# Patient Record
Sex: MALE | Race: WHITE | Employment: FULL TIME | URBAN - METROPOLITAN AREA
[De-identification: names, ages, dates, MRNs, and addresses within clinical notes are randomized per-mention and may not be internally consistent; named-entity substitution may affect disease eponyms.]

---

## 2020-04-23 ENCOUNTER — VIRTUAL VISIT (OUTPATIENT)
Dept: FAMILY MEDICINE | Facility: OTHER | Age: 30
End: 2020-04-23

## 2020-04-23 NOTE — PROGRESS NOTES
"Date: 2020 12:01:59  Clinician: Miasha Ramos  Clinician NPI: [id number]  Patient: Jared Cabrera  Patient : 1990  Patient Address: [address] [place] [address] [place] [address]  Patient Phone: [phone]  Visit Protocol: Ingrown toenail  Patient Summary:  Jared is a 30 year old ( : 1990 ) male who initiated a Visit for evaluation of Ingrown Toenail. When asked the question \"Please sign me up to receive news, health information and promotions from [employer].\", Jared responded \"No\".   A synchronous visit is necessary because the patient reported the following abnormal symptoms:   Unable to walk without limping (requires clarification)   Jared uploaded images of his condition.   His big toe on his left foot is affected. His symptoms started 1 to 7 days ago.   Symptom Details   Jared has redness, pain, swelling, and warmth on the affected toe. He also notes growth of new skin tissue and discharge on or near the affected toe. Jared feels feverish but was unable to measure his temperature at this time. He is unable to put his shoes on. He has had similar symptoms on the same toe before.   The patient also reported the following:     Redness: There are not red lines or streaks going up his leg from the affected toe.     Pain: Jared describes the pain as moderate (4-6 on a 10 point pain scale). He does not experience pain when pressure is applied to the affected toe.    Discharge: The affected toe has white discharge.     He also denies a deformed or crooked nail and open sores on or near the affected toe. He also claims that the symptoms did not start as a result of an injury. He does not have a circulation problem and has not had nerve damage in his foot.   Jared tried soaking the affected foot to treat his condition. The foot soak was somewhat effective.   Weight: 175 lbs   Jared does not smoke or use smokeless tobacco.   Additional information as reported by the patient (free text): This isn't the " first time I've had an ingrown toenail, but it's never been so swollen, painful, and lasted this long.  Typically, I can easily wait it out and manage through the pain.   Weight: 175 lbs    MEDICATIONS: No current medications, ALLERGIES: NKDA  Clinician Response:  Dear Jared,     Your symptoms today are consistent with a paronychia (or cellulitis) of your left great toe.&nbsp; This is likely secondary to the recent skin injury you had.&nbsp;&nbsp;  Please soak the affected toe (and or foot) in a warm epsom salt bath for 20-30 minutes 4 times a day, and pull the softened skin back from the nail after the soak to facilitate further drainage from the site.&nbsp; Apply bacitracin and a dressing for the next few days to keep the skin soft and allow the area to continue to drain.&nbsp;  Should symptoms persist or worsen you may need to be evaluated further in the urgent care.&nbsp; &nbsp;    Diagnosis: Cellulitis of left toe  Diagnosis ICD: L03.032  Triage Notes: I reviewed the patient's history, verified their identity, and explained the Visit process.    Confirmed demographics via phonecall,    He pulled a hangnail from the outside aspect of the left great toenail.    As an avid runner, he has had ingrown toenails in the past which have typically resolved with soaking.      This has been going on for about 3 days and pain and swelling have worsened.  Painful to walk, but he is able to walk.  Synchronous Triage: phone, status: completed, duration: 379 seconds  Prescription: amoxicillin-pot clavulanate (Augmentin) 875-125 mg oral tablet 20 tablet, 10 days supply. Take 1 tablet by mouth every 12 hours for 10 days. Refills: 0, Refill as needed: no, Allow substitutions: yes  Pharmacy: [id number] - [phone] - [address] [place] [address]

## 2020-05-24 ENCOUNTER — VIRTUAL VISIT (OUTPATIENT)
Dept: FAMILY MEDICINE | Facility: OTHER | Age: 30
End: 2020-05-24

## 2020-05-24 NOTE — PROGRESS NOTES
"Date: 2020 14:03:10  Clinician: Jose C Sevilla  Clinician NPI: [id number]  Patient: Jared Cabrera  Patient : 1990  Patient Address: [address] [place] [id number]  Patient Phone: [phone]  Visit Protocol: URI  Patient Summary:  Jared is a 30 year old ( : 1990 ) male who initiated a Visit for COVID-19 (Coronavirus) evaluation and screening. When asked the question \"Please sign me up to receive news, health information and promotions from [employer].\", Jared responded \"No\".    Jared states his symptoms started 1-2 days ago.   His symptoms consist of nausea, chills, vomiting, malaise, and myalgia. Jared also feels feverish.   Symptom details   Temperature: His current temperature is 102.4 degrees Fahrenheit. Jared has had a temperature over 100 degrees Fahrenheit for 1-2 days.    Jared denies having teeth pain, ageusia, diarrhea, facial pain or pressure, sore throat, wheezing, cough, nasal congestion, rhinitis, ear pain, headache, and anosmia. He also denies having recent facial or sinus surgery in the past 60 days. He is not experiencing dyspnea.   Precipitating events  He has not recently been exposed to someone with influenza. Jared has been in close contact with the following high risk individuals: children under the age of 5.   Pertinent COVID-19 (Coronavirus) information  In the past 14 days, Jared has not worked in a congregate living setting.   He does not work or volunteer as healthcare worker or a  and does not work or volunteer in a healthcare facility.   aJred also has not lived in a congregate living setting in the past 14 days. He lives with a healthcare worker.   Jared has not had a close contact with a laboratory-confirmed COVID-19 patient within 14 days of symptom onset.   Triage Point(s) temporarily suspended for COVID-19 (Coronavirus) screening  Jared reported the following symptoms which were previously protocol referral points. These protocol referral points have " temporarily been removed for purposes of COVID-19 (Coronavirus) screening.   Temperature &gt; 102. Current temperature: 102.4    Pertinent medical history  Jared has taken an antibiotic medication in the past month. Antibiotic details as reported by the patient (free text): Augmentin - sinus infection about a month ago   Jared needs a return to work/school note.   Weight: 168 lbs   Jared does not smoke or use smokeless tobacco.   Weight: 168 lbs  A synchronous phone visit was initiated by the provider for the following reason: discuss symptoms    MEDICATIONS: No current medications, ALLERGIES: NKDA  Clinician Response:  Dear Jared,      Dear Jared  Your symptoms show that you may have coronavirus (COVID-19). This illness can cause fever, cough and trouble breathing. Many people get a mild case and get better on their own. Some people can get very sick.  What should I do?  We would like to test you for this virus. This will be a curbside test done outside the clinic.  Please call [phone] to schedule your visit. Explain that you were [facility] by [facility] to have a COVID-19 test. Be ready to share your [facility] visit ID number.  Starting now:  Stay at least 6 feet away from others. (If someone will drive you to your test, stay in the backseat, as far away from the  as you can.)   Don't go to work, school or anywhere else. When it's time for your test, go straight to the testing site. Don't make any stops on the way there or back.   Wash your hands and face often. Use soap and water.   Cover your mouth and nose with a mask, tissue or washcloth.   Don't touch anyone. No hugging, kissing or handshakes.  While at home   Stay home and away from others (self-isolate) until:  You've had no fever---and no medicine that reduces fever---for 3 full days (72 hours). And...  Your other symptoms have gotten better. For example, your cough or breathing has improved. And...  At least 10 days have passed since your symptoms started.   "During this time:  Stay in your own room (and use your own bathroom), if you can.  Don't go to work, school or anywhere else.  Stay away from others in your home. No hugging, kissing or shaking hands.  Don't let anyone visit.  Cover your mouth and nose with a mask, tissue or washcloth to avoid spreading germs.  Clean \"high touch\" surfaces often (doorknobs, counters, handles, etc.). Use a household cleaning spray or wipes.  Wash your hands and face often. Use soap and water.  How can I take care of myself?  1. Get lots of rest. Drink extra fluids (unless your doctor has told you not to).  2. Take Tylenol (acetaminophen) for fever or pain. If you have liver or kidney problems, ask your family doctor if it's okay to take Tylenol.  Adults can take either:   650 mg (two 325 mg pills) every 4 to 6 hours, or...  1,000 mg (two 500 mg pills) every 8 hours as needed.   Note: Don't take more than 3,000 mg in one day.   Acetaminophen is found in many medicines (both prescribed and over-the-counter medicines). Read all labels to be sure you don't take too much.   For children, check the Tylenol bottle for the right dose. The dose is based on the child's age or weight.  3. If you have other health problems (like cancer, heart failure, an organ transplant or severe kidney disease): Call your specialty clinic if you don't feel better in the next 2 days.  4. Know when to call 911: If your breathing is so bad that it keeps you from doing normal activities, call 911 or go to the emergency room. Tell them that you've been staying home and may have COVID-19.  5. Sign up for [employer]. We know it's scary to hear that you might have COVID-19. We want to track your symptoms to make sure you're okay over the next 2 weeks. Please look for an email from [employer]---this is a free, online program that we'll use to keep in touch. To sign up, follow the link in the email. Learn more at [email or web address].  6. The following " will serve as your written order for this Covid Test ordered by me for the indication of suspected Covid [Z20.828]: The test will be ordered in [facility], our electronic health record after you are scheduled and will show as ordered and authorized by Adonay Trujillo MD   Order: Covid-19 (Coronavirus) PCR for SYMPTOMATIC testing from [facility]  Where can I get more information?  To learn more about COVID-19 and how to care for yourself at home, please visit the CDC website at https://www.cdc.gov/coronavirus/2019-ncov/about/steps-when-sick.html.  For more about your care at [facility], please visit [email or web address]  If you'd like to be part of a COVID-19 clinical trial (research study) at the [facility], go to [email or web address]://clinicalaffairs.[facility]-clinical-trials for details.    COVID-19 (Coronavirus) General Information  Because there is currently no vaccine to prevent infection, the best way to protect yourself is to avoid being exposed to this virus. Common symptoms of COVID-19 include but are not limited to fever, cough, and shortness of breath. These symptoms appear 2-14 days after you are exposed to the virus that causes COVID-19. Click here for more information from the CDC on how to protect yourself.  If you are sick with COVID-19 or suspect you are infected with the virus that causes COVID-19, follow the steps here from the CDC to help prevent the disease from spreading to people in your home and community.  Click here for general information from the CDC on testing.  If you develop any of these emergency warning signs for COVID-19, get medical attention immediately:     Trouble breathing    Persistent pain or pressure in the chest    New confusion or inability to arouse    Bluish lips or face      Call your doctor or clinic before going in. Call 911 if you have a medical emergency and notify the  you have or think you may have COVID-19.  For more detailed and up to date  information on COVID-19 (Coronavirus), please visit the CDC website.   Diagnosis: Myalgia  Diagnosis ICD: M79.1  Triage Notes: I reviewed the patient's history, verified their identity, and explained the Visit process.    I called the patient at around 3 pm on May 24, 2020.  Patient has been experiencing severe fatigue, high fevers, chills, myalgia, nausea, vomiting.  He lives with a health care worker who had vomiting about a day before patient started feeling ill.  I recommended that the patient undergo COVID-19 testing today.      go to the ER if any worsening shortness of breath/chest pain/lightheadedness appears.      Jose C Sevilla MD  Synchronous Triage: phone, status: completed, duration: 526 seconds

## 2020-05-27 DIAGNOSIS — Z20.822 SUSPECTED COVID-19 VIRUS INFECTION: Primary | ICD-10-CM

## 2020-05-27 PROCEDURE — 87635 SARS-COV-2 COVID-19 AMP PRB: CPT | Mod: 90 | Performed by: FAMILY MEDICINE

## 2020-05-27 PROCEDURE — 99000 SPECIMEN HANDLING OFFICE-LAB: CPT | Performed by: FAMILY MEDICINE

## 2020-05-29 LAB
SARS-COV-2 RNA SPEC QL NAA+PROBE: NOT DETECTED
SPECIMEN SOURCE: NORMAL

## 2020-11-28 ENCOUNTER — E-VISIT (OUTPATIENT)
Dept: URGENT CARE | Facility: CLINIC | Age: 30
End: 2020-11-28
Payer: COMMERCIAL

## 2020-11-28 DIAGNOSIS — Z20.822 CLOSE EXPOSURE TO 2019 NOVEL CORONAVIRUS: Primary | ICD-10-CM

## 2020-11-28 PROCEDURE — 99421 OL DIG E/M SVC 5-10 MIN: CPT | Performed by: NURSE PRACTITIONER

## 2020-11-28 NOTE — LETTER
Dear Jared Cabrera,    Based on your exposure to COVID-19 (coronavirus), we would like to test you for this virus. I have placed an order for this test.    For all employees or close contacts (except [place] and [employer] - see below), go to your [employer] home page and scroll down to the section that says  You have an appointment that needs to be scheduled  and click the large green button that says  Schedule Now  and follow the steps to find the next available opening.     If you are unable to complete these steps or if you cannot find any available times, please call [phone] to schedule employee testing.       [place] employees or close contacts, please call [phone].   [place] [employer] employees or close contacts call [phone].      If you know you have had close contact with someone who tested positive, you should be quarantined for 14 days after this exposure. You should stay in quarantine for the14 days even if the covid test is negative.     Quarantine means:  Stay home and away from others. Don't go to school or anywhere else. Generally quarantine means staying home from work but there are some exceptions to this. Please contact your workplace.  No hugging, kissing or shaking hands.  Don't let anyone visit.  Cover your mouth and nose with a mask, tissue or washcloth to avoid spreading germs.  Wash your hands and face often. Use soap and water.    What are the symptoms of COVID-19?  The most common symptoms are cough, fever and trouble breathing. Less common symptoms include headache, body aches, fatigue (feeling very tired), chills, sore throat, stuffy or runny nose, diarrhea (loose poop), loss of taste or smell, belly pain, and nausea or vomiting (feeling sick to your stomach or throwing up).  After 14 days, if you have still don't have symptoms, you likely don't have this virus.  If you develop symptoms, follow these guidelines.  If you're normally healthy: Please start another eVisit.  If  you have a serious health problem (like cancer, heart failure, an organ transplant or kidney disease): Call your specialty clinic. Let them know that you might have COVID-19.    When it's time for your COVID test:  Stay at least 6 feet away from others. (If someone will drive you to your test, stay in the backseat, as far away from the  as you can.)  Cover your mouth and nose with a mask, tissue or washcloth.  Go straight to the testing site. Don't make any stops on the way there or back.    Please note  Patients in these groups are at risk for severe illness due to COVID-19:    People 65 years and older    People who live in a nursing home or long-term care facility    People with chronic disease (lung, heart, cancer, diabetes, kidney, liver, immunologic)    People who have a weakened immune system, including those who:  o Are in cancer treatment  o Take medicine that weakens the immune system, such as corticosteroids  o Had a bone marrow or organ transplant  o Have an immune deficiency  o Have poorly controlled HIV or AIDS  o Are obese (body mass index of 40 or higher)  o Smoke regularly    Where can I get more information?  [facility] - About COVID-19: [email or web address]  CDC - What to Do If You're Sick: www.cdc.gov/coronavirus/2019-ncov/about/steps-when-sick.html  CDC - Ending Home Isolation: www.cdc.gov/coronavirus/2019-ncov/hcp/disposition-in-home-patients.html  CDC - Caring for Someone: www.cdc.gov/coronavirus/2019-ncov/if-you-are-sick/care-for-someone.html  [facility] - Interim Guidance for Hospital Discharge to Home: www.health.[facility].[place]/diseases/coronavirus/hcp/hospdischarge.pdf  [facility] clinical trials (COVID-19 research studies): clinicalaffairs.[facility]/n-clinical-trials  Below are the COVID-19 hotlines at the [place] Department of [facility] ([facility]). Interpreters are available.  For health questions: Call 675-545-3932 or 1-849.990.9334 (7 a.m. to 7 p.m.)  For  questions about schools and childcare: Call 218-676-8868 or 1-166.204.6144 (7 a.m. to 7 p.m.)

## 2020-11-28 NOTE — LETTER
[facility]  [address] [address]  Phone: [phone]    November 28, 2020        Jared Cabrera  [address]  [place] [address]          To whom it may concern:    RE: Jared Cabrera    Was tested for COVID on 11/28/20. Please excuse from work pending results. If negative may return to work, if positive will need to remain home for 14 days from today.     Please contact me for questions or concerns.      Sincerely,        NOHELIA Maher CNP

## 2020-11-29 DIAGNOSIS — Z20.822 CLOSE EXPOSURE TO 2019 NOVEL CORONAVIRUS: ICD-10-CM

## 2020-11-29 PROCEDURE — U0003 INFECTIOUS AGENT DETECTION BY NUCLEIC ACID (DNA OR RNA); SEVERE ACUTE RESPIRATORY SYNDROME CORONAVIRUS 2 (SARS-COV-2) (CORONAVIRUS DISEASE [COVID-19]), AMPLIFIED PROBE TECHNIQUE, MAKING USE OF HIGH THROUGHPUT TECHNOLOGIES AS DESCRIBED BY CMS-2020-01-R: HCPCS | Performed by: NURSE PRACTITIONER

## 2020-11-29 NOTE — PATIENT INSTRUCTIONS
Dear Jared Cabrera,    Based on your exposure to COVID-19 (coronavirus), we would like to test you for this virus. I have placed an order for this test.    For all employees or close contacts (except [place] and [employer] - see below), go to your [employer] home page and scroll down to the section that says  You have an appointment that needs to be scheduled  and click the large green button that says  Schedule Now  and follow the steps to find the next available opening.     If you are unable to complete these steps or if you cannot find any available times, please call [phone] to schedule employee testing.       [place] employees or close contacts, please call [phone].   [place] ([employer]) employees or close contacts call [phone].      If you know you have had close contact with someone who tested positive, you should be quarantined for 14 days after this exposure. You should stay in quarantine for the14 days even if the covid test is negative.     Quarantine means:  Stay home and away from others. Don't go to school or anywhere else. Generally quarantine means staying home from work but there are some exceptions to this. Please contact your workplace.  No hugging, kissing or shaking hands.  Don't let anyone visit.  Cover your mouth and nose with a mask, tissue or washcloth to avoid spreading germs.  Wash your hands and face often. Use soap and water.    What are the symptoms of COVID-19?  The most common symptoms are cough, fever and trouble breathing. Less common symptoms include headache, body aches, fatigue (feeling very tired), chills, sore throat, stuffy or runny nose, diarrhea (loose poop), loss of taste or smell, belly pain, and nausea or vomiting (feeling sick to your stomach or throwing up).  After 14 days, if you have still don't have symptoms, you likely don't have this virus.  If you develop symptoms, follow these guidelines.  If you're normally healthy: Please start another  eVisit.  If you have a serious health problem (like cancer, heart failure, an organ transplant or kidney disease): Call your specialty clinic. Let them know that you might have COVID-19.    When it's time for your COVID test:  Stay at least 6 feet away from others. (If someone will drive you to your test, stay in the backseat, as far away from the  as you can.)  Cover your mouth and nose with a mask, tissue or washcloth.  Go straight to the testing site. Don't make any stops on the way there or back.    Please note  Patients in these groups are at risk for severe illness due to COVID-19:    People 65 years and older    People who live in a nursing home or long-term care facility    People with chronic disease (lung, heart, cancer, diabetes, kidney, liver, immunologic)    People who have a weakened immune system, including those who:  o Are in cancer treatment  o Take medicine that weakens the immune system, such as corticosteroids  o Had a bone marrow or organ transplant  o Have an immune deficiency  o Have poorly controlled HIV or AIDS  o Are obese (body mass index of 40 or higher)  o Smoke regularly    Where can I get more information?  [facility] - About COVID-19: [email or web address]  CDC - What to Do If You're Sick: www.cdc.gov/coronavirus/2019-ncov/about/steps-when-sick.html  CDC - Ending Home Isolation: www.cdc.gov/coronavirus/2019-ncov/hcp/disposition-in-home-patients.html  CDC - Caring for Someone: www.cdc.gov/coronavirus/2019-ncov/if-you-are-sick/care-for-someone.html  [facility] - Interim Guidance for Hospital Discharge to Home: www.health.[facility].[place].us/diseases/coronavirus/hcp/hospdischarge.pdf  [facility] clinical trials (COVID-19 research studies): clinicalaffairs.[facility]/n-clinical-trials  Below are the COVID-19 hotlines at the [place] Department of [facility] ([facility]). Interpreters are available.  For health questions: Call 510-225-6640 or 1-706.770.2010 (7 a.m. to 7  p.m.)  For questions about schools and childcare: Call 925-845-4744 or 1-737.810.8335 (7 a.m. to 7 p.m.)

## 2020-11-30 LAB
SARS-COV-2 RNA SPEC QL NAA+PROBE: NOT DETECTED
SPECIMEN SOURCE: NORMAL

## 2021-01-04 ENCOUNTER — HEALTH MAINTENANCE LETTER (OUTPATIENT)
Age: 31
End: 2021-01-04

## 2021-03-19 ENCOUNTER — E-VISIT (OUTPATIENT)
Dept: URGENT CARE | Facility: CLINIC | Age: 31
End: 2021-03-19
Payer: COMMERCIAL

## 2021-03-19 DIAGNOSIS — R30.0 DIFFICULT OR PAINFUL URINATION: Primary | ICD-10-CM

## 2021-03-19 PROCEDURE — 99207 PR NON-BILLABLE SERV PER CHARTING: CPT | Performed by: FAMILY MEDICINE

## 2021-03-19 NOTE — PATIENT INSTRUCTIONS
Dear Jared Cabrera,    We are sorry you are not feeling well. Based on the responses you provided, it is recommended that you be seen in-person in urgent care so we can better evaluate your symptoms. Please click here to find the nearest urgent care location to you.   You will not be charged for this Visit. Thank you for trusting us with your care.    Alexsandra Ngo MD

## 2021-05-03 ENCOUNTER — MYC MEDICAL ADVICE (OUTPATIENT)
Dept: DERMATOLOGY | Facility: CLINIC | Age: 31
End: 2021-05-03

## 2021-05-05 ENCOUNTER — OFFICE VISIT (OUTPATIENT)
Dept: DERMATOLOGY | Facility: CLINIC | Age: 31
End: 2021-05-05
Payer: COMMERCIAL

## 2021-05-05 DIAGNOSIS — B07.9 VERRUCA: Primary | ICD-10-CM

## 2021-05-05 PROCEDURE — 17110 DESTRUCTION B9 LES UP TO 14: CPT | Performed by: PHYSICIAN ASSISTANT

## 2021-05-05 ASSESSMENT — PAIN SCALES - GENERAL: PAINLEVEL: NO PAIN (0)

## 2021-05-05 NOTE — LETTER
5/5/2021         RE: Jared Cabrera  [address]  [place] [address]        Dear Colleague,    Thank you for referring your patient, Jared Cabrera, to the [facility]. Please see a copy of my visit note below.    [facility] Dermatology Note  Encounter Date: May 5, 2021  Office Visit     Dermatology Problem List:  1. Verruca plantaris  -current tx: cryo 5/5/21, Compound W    ____________________________________________    Assessment & Plan:    # Verruca vulgaris - left plantar foot x 8 and right plantar foot x 5   - See cryo procedure note.   - Continue Compound W or WartStick treatment at home once able to tolerate   - Consider Candida if non responsive to cryo.    Procedures Performed:   - Verruca were pared down with gentle curettage prior to cryotherapy procedure.  -Cryotherapy procedure note:  After verbal consent and discussion of risks and benefits including but no limited to dyspigmentation/scar, blister, and pain, 13 verruca was(were) treated with 1-2mm freeze border for 2 cycles with liquid nitrogen. Post cryotherapy instructions were provided.     Follow-up: 1 month(s) in-person, or earlier for new or changing lesions    Staff and Scribe:     Scribe Disclosure:   I, Romario Jimenez, am serving as a scribe to document services personally performed by Vidhya Crabtree PA-C, based on data collection and the provider's statements to me.    Provider Disclosure:   The documentation recorded by the scribe accurately reflects the services I personally performed and the decisions made by me.    All risks, benefits and alternatives were discussed with patient.  Patient is in agreement and understands the assessment and plan.  All questions were answered.    Vidhya Crabtree PA-C, MPAS  [facility]: Phone: [phone], Fax: [phone]  [facility]: Phone: [phone],   Fax: [phone]  ____________________________________________    CC: Derm Problem (Warts on bottom of feet)    HPI:  Mr. Jared Cabrera is a(n) 31 year old male who presents today as a new patient for warts.    Self referred.    Today, he presents for evaluation of lesions on the plantar feet. It is difficult to tell how long they've been there, but thinks it has been a couple of years. He has noticed more developing in the past year, which lead him to follow up. He had used Compound W for 12 weeks, and soaked his feet ahead of time without much improvement. He did this a second round, and saw some improvement.    Patient is otherwise feeling well, without additional skin concerns.    Labs Reviewed:  N/A    Physical Exam:  Vitals: There were no vitals taken for this visit.  SKIN: Focused examination of the plantar feet was performed.  - There are verrucous papules with thrombosed capillaries interrupting dermatoglyphics on the left plantar foot x 8 and right plantar foot x 5.  - No other lesions of concern on areas examined.     Medications:  No current outpatient medications on file.     No current facility-administered medications for this visit.       Past Medical History:   There is no problem list on file for this patient.    No past medical history on file.     CC Dr. Prince on close of this encounter.    Jared Cabrera's goals for this visit include:   Chief Complaint   Patient presents with     Derm Problem     Warts on bottom of feet       He requests these members of his care team be copied on today's visit information: no    PCP: No Ref-Primary, Physician    Referring Provider:  No referring provider defined for this encounter.    There were no vitals taken for this visit.    Do you need any medication refills at today's visit? No  Faye Franks LPN          Again, thank you for allowing me to participate in the care of your patient.        Sincerely,        Vidhya Crabtree PA-C

## 2021-05-05 NOTE — PATIENT INSTRUCTIONS
You should continue using Compound W. You can be as aggressive in use of Compound W as you can tolerate. I recommend covering the areas with duct tape after applying Compound W to hold the medicine in place. You can replace this and reapply the duct tape and Compound W every time the duct tape falls off. You can do this up until the next appointment.    You can also consider using WartStick instead of Compound W.    I would recommend avoiding the use of a pumice stone, as these can help spread warts.    Cryotherapy    What is it?    Use of a very cold liquid, such as liquid nitrogen, to freeze and destroy abnormal skin cells that need to be removed    What should I expect?    Tenderness and redness    A small blister that might grow and fill with dark purple blood. There may be crusting.    More than one treatment may be needed if the lesions do not go away.    How do I care for the treated area?    Gently wash the area with your hands when bathing.    Use a thin layer of Vaseline to help with healing. You may use a Band-Aid.     The area should heal within 7-10 days and may leave behind a pink or lighter color.     Do not use an antibiotic or Neosporin ointment.     You may take acetaminophen (Tylenol) for pain.     Call your Doctor if you have:    Severe pain    Signs of infection (warmth, redness, cloudy yellow drainage, and or a bad smell)    Questions or concerns    Who should I call with questions?       [facility]: [phone]       [facility]: [phone]       For urgent needs outside of business hours call the [facility] at [phone]        and ask for the dermatology resident on call

## 2021-05-05 NOTE — PROGRESS NOTES
[facility] Dermatology Note  Encounter Date: May 5, 2021  Office Visit     Dermatology Problem List:  1. Verruca plantaris  -current tx: cryo 5/5/21, Compound W    ____________________________________________    Assessment & Plan:    # Verruca vulgaris - left plantar foot x 8 and right plantar foot x 5   - See cryo procedure note.   - Continue Compound W or WartStick treatment at home once able to tolerate   - Consider Candida if non responsive to cryo.    Procedures Performed:   - Verruca were pared down with gentle curettage prior to cryotherapy procedure.  -Cryotherapy procedure note:  After verbal consent and discussion of risks and benefits including but no limited to dyspigmentation/scar, blister, and pain, 13 verruca was(were) treated with 1-2mm freeze border for 2 cycles with liquid nitrogen. Post cryotherapy instructions were provided.     Follow-up: 1 month(s) in-person, or earlier for new or changing lesions    Staff and Scribe:     Scribe Disclosure:   I, Romario Jimenez, am serving as a scribe to document services personally performed by Vidhya Crabtree PA-C, based on data collection and the provider's statements to me.    Provider Disclosure:   The documentation recorded by the scribe accurately reflects the services I personally performed and the decisions made by me.    All risks, benefits and alternatives were discussed with patient.  Patient is in agreement and understands the assessment and plan.  All questions were answered.    Vidhya Crabtree PA-C, [facility]  [facility] [place]: Phone: [phone], Fax: [phone]  [facility]: Phone: [phone],  Fax: [phone]  ____________________________________________    CC: Derm Problem (Warts on bottom of feet)    HPI:  Mr. Jared Cabrera is a(n) 31 year old male who presents today as a new patient for warts.    Self referred.    Today, he presents for  evaluation of lesions on the plantar feet. It is difficult to tell how long they've been there, but thinks it has been a couple of years. He has noticed more developing in the past year, which lead him to follow up. He had used Compound W for 12 weeks, and soaked his feet ahead of time without much improvement. He did this a second round, and saw some improvement.    Patient is otherwise feeling well, without additional skin concerns.    Labs Reviewed:  N/A    Physical Exam:  Vitals: There were no vitals taken for this visit.  SKIN: Focused examination of the plantar feet was performed.  - There are verrucous papules with thrombosed capillaries interrupting dermatoglyphics on the left plantar foot x 8 and right plantar foot x 5.  - No other lesions of concern on areas examined.     Medications:  No current outpatient medications on file.     No current facility-administered medications for this visit.       Past Medical History:   There is no problem list on file for this patient.    No past medical history on file.     CC Dr. Prince on close of this encounter.

## 2021-05-05 NOTE — PROGRESS NOTES
Jared Cabrera's goals for this visit include:   Chief Complaint   Patient presents with     Derm Problem     Warts on bottom of feet       He requests these members of his care team be copied on today's visit information: no    PCP: No Ref-Primary, Physician    Referring Provider:  No referring provider defined for this encounter.    There were no vitals taken for this visit.    Do you need any medication refills at today's visit? No  Faye Franks LPN

## 2021-06-14 ENCOUNTER — OFFICE VISIT (OUTPATIENT)
Dept: DERMATOLOGY | Facility: CLINIC | Age: 31
End: 2021-06-14
Payer: COMMERCIAL

## 2021-06-14 DIAGNOSIS — B07.0 VERRUCA PLANTARIS: Primary | ICD-10-CM

## 2021-06-14 PROCEDURE — 17110 DESTRUCTION B9 LES UP TO 14: CPT | Performed by: DERMATOLOGY

## 2021-06-14 PROCEDURE — 11900 INJECT SKIN LESIONS </W 7: CPT | Mod: 59 | Performed by: DERMATOLOGY

## 2021-06-14 RX ORDER — CANDIDA ALBICANS 1000 [PNU]/ML
0.1 INJECTION, SOLUTION INTRADERMAL ONCE
Status: ACTIVE | OUTPATIENT
Start: 2021-06-14

## 2021-06-14 NOTE — NURSING NOTE
Jared Cabrera's goals for this visit include:   Chief Complaint   Patient presents with     Derm Problem     wart treatment, patient states sees improvment but not fully gone       He requests these members of his care team be copied on today's visit information: no    PCP: No Ref-Primary, Physician    Referring Provider:  No referring provider defined for this encounter.    There were no vitals taken for this visit.    Do you need any medication refills at today's visit? No    Renée Sims LPN

## 2021-06-14 NOTE — PATIENT INSTRUCTIONS
Cryotherapy    What is it?    Use of a very cold liquid, such as liquid nitrogen, to freeze and destroy abnormal skin cells that need to be removed    What should I expect?    Tenderness and redness    A small blister that might grow and fill with dark purple blood. There may be crusting.    More than one treatment may be needed if the lesions do not go away.    How do I care for the treated area?    Gently wash the area with your hands when bathing.    Use a thin layer of Vaseline to help with healing. You may use a Band-Aid.     The area should heal within 7-10 days and may leave behind a pink or lighter color.     Do not use an antibiotic or Neosporin ointment.     You may take acetaminophen (Tylenol) for pain.     Call your Doctor if you have:    Severe pain    Signs of infection (warmth, redness, cloudy yellow drainage, and or a bad smell)    Questions or concerns    Who should I call with questions?       [facility]: [phone]       [facility]: [phone]       For urgent needs outside of business hours call the [facility] [[email or web address] at [phone]        and ask for the dermatology resident on call

## 2021-06-14 NOTE — PROGRESS NOTES
[facility] Dermatology Note  Encounter Date: Jun 14, 2021  Office Visit     Dermatology Problem List:  1. Verruca plantaris   current tx:   - cryo 5/5/21, 6/14/21,   - candida 6/14/21  - compound W    ____________________________________________    Assessment & Plan:    # Verruca vulgaris - left plantar foot x 6 and right plantar foot x 7  - Verruca were pared down with gentle curettage prior to cryotherapy procedure. See cryo procedure note.  - See candida procedure note.  - Continue Compound W or WartStick treatment at home.     Procedures Performed:     - Cryotherapy procedure note:  After verbal consent and discussion of risks and benefits including but no limited to dyspigmentation/scar, blister, and pain, 13 verruca were treated with 1-2mm freeze border for 2 cycles with liquid nitrogen. Post cryotherapy instructions were provided.    Patient has failed successive treatment with cryotherapy so I have recommended a series of intralesional immunotherapy with candida antigen.  This is a series of 3-4 injections and is about 70% effective.  Most patients don't see any improvement until after at least 2 injections.  After verbal consent was obtained, the skin was cleaned with an alcohol wipe and 0.3 ml of candida was injected under 2 lesions on the plantar feet.  The patient tolerated the procedure well.  A bandage was placed at the site.     Follow-up: 4 weeks in-person, or earlier for new or changing lesions    Staff and Scribe:     Scribe Disclosure:   I, Phuong Bermeo, am serving as a scribe to document services personally performed by this physician, Dr. Abdoulaye Gary, based on data collection and the provider's statements to me.     Provider Disclosure:   The documentation recorded by the scribe accurately reflects the services I personally performed and the decisions made by me.    Abdoulaye Gary MD    Department of Dermatology  [facility]  [facility]: Phone: [phone], Fax:[phone]  [facility]: Phone: [phone] Fax: [phone]  ____________________________________________    CC: Derm Problem (wart treatment, patient states sees improvment but not fully gone)    HPI:  Mr. Jared Cabrera is a(n) 31 year old male who presents today as a return patient for warts.    Last seen 5/5/21 by Vidhya Crabtree PA-C. At that time, cryo was performed on 13 warts on the plantar feet.    Today, patient notes improvement of lesions on feet from previous visit. Not fully gone. Have gotten smaller. Present for the last year. Has been using Compound W gel daily or twice a day. Has tried duct tape in the past, but it was difficult based on location.    The patient otherwise denies any new or concerning lesions. No bleeding, painful, pruritic, or changing lesions. Health otherwise stable. No other skin concerns.    Labs Reviewed:  N/A    Physical Exam:  Vitals: There were no vitals taken for this visit.  SKIN: Focused examination of the feet was performed.  - There are verrucous papules with thrombosed capillaries interrupting dermatoglyphics on the left plantar foot x 6 and right plantar foot x 7.   - No other lesions of concern on areas examined.     Medications:  No current outpatient medications on file.     No current facility-administered medications for this visit.       Past Medical History:   There is no problem list on file for this patient.    No past medical history on file.

## 2021-06-14 NOTE — LETTER
6/14/2021         RE: Jared Cabrera  [address]  [place] [address]        Dear Colleague,    Thank you for referring your patient, Jared Cabrera, to the [facility]. Please see a copy of my visit note below.    [facility] Dermatology Note  Encounter Date: Jun 14, 2021  Office Visit     Dermatology Problem List:  1. Verruca plantaris   current tx:   - cryo 5/5/21, 6/14/21,   - candida 6/14/21  - compound W    ____________________________________________    Assessment & Plan:    # Verruca vulgaris - left plantar foot x 6 and right plantar foot x 7  - Verruca were pared down with gentle curettage prior to cryotherapy procedure. See cryo procedure note.  - See candida procedure note.  - Continue Compound W or WartStick treatment at home.     Procedures Performed:     - Cryotherapy procedure note:  After verbal consent and discussion of risks and benefits including but no limited to dyspigmentation/scar, blister, and pain, 13 verruca were treated with 1-2mm freeze border for 2 cycles with liquid nitrogen. Post cryotherapy instructions were provided.    Patient has failed successive treatment with cryotherapy so I have recommended a series of intralesional immunotherapy with candida antigen.  This is a series of 3-4 injections and is about 70% effective.  Most patients don't see any improvement until after at least 2 injections.  After verbal consent was obtained, the skin was cleaned with an alcohol wipe and 0.3 ml of candida was injected under 2 lesions on the plantar feet.  The patient tolerated the procedure well.  A bandage was placed at the site.     Follow-up: 4 weeks in-person, or earlier for new or changing lesions    Staff and Scribe:     Scribe Disclosure:   I, Phuong Bermeo, am serving as a scribe to document services personally performed by this physician, Dr. Abdoulaye Gary, based on data collection and the provider's statements to me.      Provider Disclosure:   The documentation recorded by the scribe accurately reflects the services I personally performed and the decisions made by me.    Abdoulaye Gary MD    Department of Dermatology  [facility]: Phone: [phone], Fax:[phone]  [facility]: Phone: [phone] Fax: [phone]  ____________________________________________    CC: Derm Problem (wart treatment, patient states sees improvment but not fully gone)    HPI:  Mr. Jared Cabrera is a(n) 31 year old male who presents today as a return patient for warts.    Last seen 5/5/21 by Vidhya Crabtree PA-C. At that time, cryo was performed on 13 warts on the plantar feet.    Today, patient notes improvement of lesions on feet from previous visit. Not fully gone. Have gotten smaller. Present for the last year. Has been using Compound W gel daily or twice a day. Has tried duct tape in the past, but it was difficult based on location.    The patient otherwise denies any new or concerning lesions. No bleeding, painful, pruritic, or changing lesions. Health otherwise stable. No other skin concerns.    Labs Reviewed:  N/A    Physical Exam:  Vitals: There were no vitals taken for this visit.  SKIN: Focused examination of the feet was performed.  - There are verrucous papules with thrombosed capillaries interrupting dermatoglyphics on the left plantar foot x 6 and right plantar foot x 7.   - No other lesions of concern on areas examined.     Medications:  No current outpatient medications on file.     No current facility-administered medications for this visit.       Past Medical History:   There is no problem list on file for this patient.    No past medical history on file.         Again, thank you for allowing me to participate in the care of your patient.        Sincerely,        Abdoulaye Gary MD

## 2021-06-14 NOTE — NURSING NOTE
The following medication was given:     MEDICATION: Candida  ROUTE: [place]  SITE: see note  DOSE: 0.3cc  LOT #: [id number]  : [place]  EXPIRATION DATE:  4/16/22    Cookie Almodovar CMA on 6/14/2021 at 4:31 PM

## 2021-07-15 ENCOUNTER — OFFICE VISIT (OUTPATIENT)
Dept: DERMATOLOGY | Facility: CLINIC | Age: 31
End: 2021-07-15
Payer: COMMERCIAL

## 2021-07-15 DIAGNOSIS — B07.0 VERRUCA PLANTARIS: Primary | ICD-10-CM

## 2021-07-15 PROCEDURE — 11900 INJECT SKIN LESIONS </W 7: CPT | Mod: 59 | Performed by: DERMATOLOGY

## 2021-07-15 PROCEDURE — 17111 DESTRUCTION B9 LESIONS 15/>: CPT | Mod: GC | Performed by: DERMATOLOGY

## 2021-07-15 RX ORDER — CANDIDA ALBICANS 1000 [PNU]/ML
0.1 INJECTION, SOLUTION INTRADERMAL ONCE
Status: COMPLETED | OUTPATIENT
Start: 2021-07-15 | End: 2021-07-15

## 2021-07-15 RX ADMIN — CANDIDA ALBICANS 0.1 ML: 1000 INJECTION, SOLUTION INTRADERMAL at 10:17

## 2021-07-15 ASSESSMENT — PAIN SCALES - GENERAL: PAINLEVEL: NO PAIN (0)

## 2021-07-15 NOTE — PROGRESS NOTES
[facility] Dermatology Note  Encounter Date: Jul 15, 2021  Office Visit     Dermatology Problem List:  1. Helga plantarchey   current tx:   - cryo 5/5/21, 6/14/21,   - candida 6/14/21  - compound W    ____________________________________________    Assessment & Plan:     # Helga galvan  - Cryotherapy and Candida injection performed today (see procedure note(s) below).   - More than 15 skin lesions treated on the feet today.  - Cantharidin injected as below into one skin lesion on the right plantar foot.  - Recommended HPV vaccine series    Procedures Performed:   - Cryotherapy procedure note, location(s): feet. After verbal consent and discussion of risks and benefits including, but not limited to, dyspigmentation/scar, blister, and pain, 18 lesion(s) was(were) treated with 1-2 mm freeze border for 1-2 cycles with liquid nitrogen. Post cryotherapy instructions were provided.  - Intra-lesional candida injection procedure note. Discussion had with patient regarding candida antigen injection as potential treatment option and verbal consent obtained. After positioning and cleansing with isopropyl alcohol, 0.3 total mL of candida albicans antigen was injected into 1 lesion(s) on the right plantar foot. The patient tolerated the procedure well and left the dermatology clinic in good condition.    Follow-up: 1 month(s) in-person, or earlier for new or changing lesions    Staff and Fellow:     Dr. Sergei Prince    Staff Physician Comments:   I saw and evaluated the patient with the Mohs Surgery Fellow (Dr. Manpreet Cohen) and I agree with the assessment and plan and the above description of the procedure. I was present for the key portions of the procedure and entire exam.    Brandt Prince DO    Department of Dermatology  [facility]: Phone: [phone], Fax:[phone]  [facility]  "[facility] Center: Phone: [phone], Fax: [phone]    ____________________________________________    CC: Derm Problem (wart treatment on feet, pt states \"they seem smaller\")    HPI:  Mr. Jared Cabrera is a(n) 31 year old male who presents today as a return patient for verruca plantaris of the feet. He had been using Compound W at home. He last received candidal injection and LN2 about 1 month ago with Dr. Gary. He would like to transition those treatments to  Dermatology due to proximity from home. No associated symptoms or other modifying factors. Has not received HPV vaccine series.    Patient is otherwise feeling well, without additional skin concerns.    Labs Reviewed:  N/A    Physical Exam:  Vitals: There were no vitals taken for this visit.  SKIN: Focused examination of feet was performed.  - There are verrucous papules with thrombosed capillaries interrupting dermatoglyphics on the plantar feet. 18 lesions were identified.   - No other lesions of concern on areas examined.     Medications:  No current outpatient medications on file.     Current Facility-Administered Medications   Medication     candida albicans skin test injection 0.1 mL     candida albicans skin test injection 0.1 mL      Past Medical History:   Previously healthy  "

## 2021-07-15 NOTE — LETTER
7/15/2021         RE: Jared Cabrera  [address]  [place] [address]        Dear Colleague,    Thank you for referring your patient, Jared Cabrera, to the [facility]. Please see a copy of my visit note below.    [facility] Dermatology Note  Encounter Date: Jul 15, 2021  Office Visit     Dermatology Problem List:  1. Verruca plantaris   current tx:   - cryo 5/5/21, 6/14/21,   - candida 6/14/21  - compound W    ____________________________________________    Assessment & Plan:     # Helga plantaris  - Cryotherapy and Candida injection performed today (see procedure note(s) below).   - More than 15 skin lesions treated on the feet today.  - Cantharidin injected as below into one skin lesion on the right plantar foot.  - Recommended HPV vaccine series    Procedures Performed:   - Cryotherapy procedure note, location(s): feet. After verbal consent and discussion of risks and benefits including, but not limited to, dyspigmentation/scar, blister, and pain, 18 lesion(s) was(were) treated with 1-2 mm freeze border for 1-2 cycles with liquid nitrogen. Post cryotherapy instructions were provided.  - Intra-lesional candida injection procedure note. Discussion had with patient regarding candida antigen injection as potential treatment option and verbal consent obtained. After positioning and cleansing with isopropyl alcohol, 0.3 total mL of candida albicans antigen was injected into 1 lesion(s) on the right plantar foot. The patient tolerated the procedure well and left the dermatology clinic in good condition.    Follow-up: 1 month(s) in-person, or earlier for new or changing lesions    Staff and Fellow:     Dr. Sergei Prince    Staff Physician Comments:   I saw and evaluated the patient with the Mohs Surgery Fellow (Dr. Manpreet Cohen) and I agree with the assessment and plan and the above description of the procedure. I was present for the herrera portions  "of the procedure and entire exam.    Brandt Prince DO    Department of Dermatology  [facility]: Phone: [phone], Fax:[phone]  [facility]: Phone: [phone], Fax: [phone]    ____________________________________________    CC: Derm Problem (wart treatment on feet, pt states \"they seem smaller\")    HPI:  Mr. Jared Cabrera is a(n) 31 year old male who presents today as a return patient for verruca plantaris of the feet. He had been using Compound W at home. He last received candidal injection and LN2 about 1 month ago with Dr. Gary. He would like to transition those treatments to  Dermatology due to proximity from home. No associated symptoms or other modifying factors. Has not received HPV vaccine series.    Patient is otherwise feeling well, without additional skin concerns.    Labs Reviewed:  N/A    Physical Exam:  Vitals: There were no vitals taken for this visit.  SKIN: Focused examination of feet was performed.  - There are verrucous papules with thrombosed capillaries interrupting dermatoglyphics on the plantar feet. 18 lesions were identified.   - No other lesions of concern on areas examined.     Medications:  No current outpatient medications on file.     Current Facility-Administered Medications   Medication     candida albicans skin test injection 0.1 mL     candida albicans skin test injection 0.1 mL      Past Medical History:   Previously healthy      Again, thank you for allowing me to participate in the care of your patient.        Sincerely,        Brandt Prince MD    "

## 2021-07-15 NOTE — NURSING NOTE
Clinic Administered Medication Documentation    Administrations This Visit     candida albicans skin test injection 0.1 mL     Admin Date  07/15/2021 Action  Given Dose  0.1 mL Route  Intradermal Site   Administered By  Fariba Patel LPN    Ordering Provider: Brandt Prince MD    Patient Supplied?: No                Fariba Patel LPN

## 2021-07-15 NOTE — PATIENT INSTRUCTIONS
Cryotherapy    What is it?    Use of a very cold liquid, such as liquid nitrogen, to freeze and destroy abnormal skin cells that need to be removed    What should I expect?    Tenderness and redness    A small blister that might grow and fill with dark purple blood. There may be crusting.    More than one treatment may be needed if the lesions do not go away.    How do I care for the treated area?    Gently wash the area with your hands when bathing.    Use a thin layer of Vaseline to help with healing. You may use a Band-Aid.     The area should heal within 7-10 days and may leave behind a pink or lighter color.     Do not use an antibiotic or Neosporin ointment.     You may take acetaminophen (Tylenol) for pain.     Call your Doctor if you have:    Severe pain    Signs of infection (warmth, redness, cloudy yellow drainage, and or a bad smell)    Questions or concerns    Who should I call with questions?       [facility]: [phone]       [facility]: [phone]       For urgent needs outside of business hours call the [facility] at [phone]        and ask for the dermatology resident on call

## 2021-07-15 NOTE — NURSING NOTE
"Jared Cabrera's goals for this visit include:   Chief Complaint   Patient presents with     Derm Problem     wart treatment on feet, pt states \"they seem smaller\"       He requests these members of his care team be copied on today's visit information:     PCP: No Ref-Primary, Physician    Referring Provider:  Referred Self, MD  No address on file    There were no vitals taken for this visit.    Do you need any medication refills at today's visit? No    Bibiana Hancock LPN      "

## 2021-08-26 ENCOUNTER — OFFICE VISIT (OUTPATIENT)
Dept: DERMATOLOGY | Facility: CLINIC | Age: 31
End: 2021-08-26
Payer: COMMERCIAL

## 2021-08-26 DIAGNOSIS — B07.0 VERRUCA PLANTARIS: Primary | ICD-10-CM

## 2021-08-26 PROCEDURE — 17111 DESTRUCTION B9 LESIONS 15/>: CPT | Mod: GC | Performed by: DERMATOLOGY

## 2021-08-26 PROCEDURE — 11900 INJECT SKIN LESIONS </W 7: CPT | Mod: 59 | Performed by: DERMATOLOGY

## 2021-08-26 RX ORDER — CANDIDA ALBICANS 1000 [PNU]/ML
0.3 INJECTION, SOLUTION INTRADERMAL ONCE
Status: COMPLETED | OUTPATIENT
Start: 2021-08-26 | End: 2021-08-26

## 2021-08-26 RX ADMIN — CANDIDA ALBICANS 0.3 ML: 1000 INJECTION, SOLUTION INTRADERMAL at 16:53

## 2021-08-26 ASSESSMENT — PAIN SCALES - GENERAL: PAINLEVEL: NO PAIN (0)

## 2021-08-26 NOTE — NURSING NOTE
"Jared Cabrera's goals for this visit include:   Chief Complaint   Patient presents with     Derm Problem     Jared is here today for wart follow up, pt states \"little improvement, no improvement on others\"       He requests these members of his care team be copied on today's visit information:     PCP: No Ref-Primary, Physician    Referring Provider:  Referred Self, MD  No address on file    There were no vitals taken for this visit.    Do you need any medication refills at today's visit? No    Bibiana Hancock LPN      "

## 2021-08-26 NOTE — LETTER
8/26/2021         RE: Jared Cabrera  [address]  [place] [address]        Dear Colleague,    Thank you for referring your patient, Jared Cabrera, to the [facility]. Please see a copy of my visit note below.    [facility] Dermatology Note  Encounter Date: Aug 26, 2021  Office Visit     Dermatology Problem List:  1. Verruca plantaris   current tx:   - 30% TCA 8/26/21  - cryo 5/5/21, 6/14/21, 7/15/21, 8/26/21  - candida 6/14/21, 7/15/21, 8/26/21  - compound W    ____________________________________________    Assessment & Plan:    # Verruca plantaris - improvement noted compared to previously  - The lesions were first treated with gentle curettage. Cryotherapy and Candida injection were then performed (see procedure note(s) below).  - Finally, 30% TCA was applied. Patient instructed to wash foot in 4 hours.  - More than 15 skin lesions treated on the feet today.  - Recommended HPV vaccine series        Procedures Performed:   - Cryotherapy procedure note. After verbal consent and discussion of risks and benefits including, but not limited to, dyspigmentation/scar, blister, and pain, 17 lesion(s) was(were) treated with 1-2 mm freeze border for 1-2 cycles with liquid nitrogen. Post cryotherapy instructions were provided.  - Intra-lesional candida injection procedure note. Discussion had with patient regarding candida antigen injection as potential treatment option and verbal consent obtained. After positioning and cleansing with isopropyl alcohol, 0.3 total mL of candida albicans antigen was injected into 1 lesion(s) on the left medial heel. The patient tolerated the procedure well and left the dermatology clinic in good condition.    Follow-up: 1 month(s) in-person, or earlier for new or changing lesions    Staff and Scribe:     Scribe Disclosure:   Romario ALBERT, am serving as a scribe to document services personally performed by this physician,   "Brandt Prince, based on data collection and the provider's statements to me.       Staff Physician Comments:   I saw and evaluated the patient with the resident (Yossi) and I agree with the assessment and plan as documented by the scribe. I was present for the entire procedure and examination.    Brandt Prince DO    Department of Dermatology  [facility]: Phone: [phone], Fax:[phone]  [facility]: Phone: [phone], Fax: [phone]    ____________________________________________    CC: Derm Problem (Jared is here today for wart follow up, pt states \"little improvement, no improvement on others\")    HPI:  Mr. Jared Cabrera is a(n) 31 year old male who presents today as a return patient for treatment of verruca.    Last seen 7/15/21 for verruca plantaris of the feet. 18 lesions were treated with cryo on the feet, and intralesional candida was injected into the right plantar foot. Cantharidin was also injected into one lesion on the right plantar foot.    Today, he notes he has seen minimal improvement since his previous visit. At home, he has been using Compound W gel daily.     Patient is otherwise feeling well, without additional skin concerns.    Labs Reviewed:  N/A    Physical Exam:  Vitals: There were no vitals taken for this visit.  SKIN: Focused examination of the feet was performed.  -There are verrucous papules with thrombosed capillaries interrupting dermatoglyphics on the plantar feet. 17 lesions were identified.   - No other lesions of concern on areas examined.     Medications:  No current outpatient medications on file.     Current Facility-Administered Medications   Medication     candida albicans skin test injection 0.1 mL      Past Medical History:   Previously healthy             Again, thank you for allowing me to participate in the care of your patient.  "       Sincerely,        Brandt Prince MD

## 2021-08-26 NOTE — PATIENT INSTRUCTIONS
Wash your foot after 4 hours to allow the TCA to work. Wash your foot around 8 PM tonight.    Continue to apply your Compound W daily. You can also use the curette we gave you to scrape some of the white skin prior to applying your Compound W    Cryotherapy    What is it?    Use of a very cold liquid, such as liquid nitrogen, to freeze and destroy abnormal skin cells that need to be removed    What should I expect?    Tenderness and redness    A small blister that might grow and fill with dark purple blood. There may be crusting.    More than one treatment may be needed if the lesions do not go away.    How do I care for the treated area?    Gently wash the area with your hands when bathing.    Use a thin layer of Vaseline to help with healing. You may use a Band-Aid.     The area should heal within 7-10 days and may leave behind a pink or lighter color.     Do not use an antibiotic or Neosporin ointment.     You may take acetaminophen (Tylenol) for pain.     Call your doctor if you have:    Severe pain    Signs of infection (warmth, redness, cloudy yellow drainage, and or a bad smell)    Questions or concerns    Who should I call with questions?       [facility]: [phone]       [facility]: [phone]       For urgent needs outside of business hours call the [facility] at [phone] and ask for the dermatology resident on call

## 2021-08-26 NOTE — PROGRESS NOTES
[facility] Dermatology Note  Encounter Date: Aug 26, 2021  Office Visit     Dermatology Problem List:  1. Verruca plantaris   current tx:   - 30% TCA 8/26/21  - cryo 5/5/21, 6/14/21, 7/15/21, 8/26/21  - candida 6/14/21, 7/15/21, 8/26/21  - compound W    ____________________________________________    Assessment & Plan:    # Verruca plantaris - improvement noted compared to previously  - The lesions were first treated with gentle curettage. Cryotherapy and Candida injection were then performed (see procedure note(s) below).  - Finally, 30% TCA was applied. Patient instructed to wash foot in 4 hours.  - More than 15 skin lesions treated on the feet today.  - Recommended HPV vaccine series        Procedures Performed:   - Cryotherapy procedure note. After verbal consent and discussion of risks and benefits including, but not limited to, dyspigmentation/scar, blister, and pain, 17 lesion(s) was(were) treated with 1-2 mm freeze border for 1-2 cycles with liquid nitrogen. Post cryotherapy instructions were provided.  - Intra-lesional candida injection procedure note. Discussion had with patient regarding candida antigen injection as potential treatment option and verbal consent obtained. After positioning and cleansing with isopropyl alcohol, 0.3 total mL of candida albicans antigen was injected into 1 lesion(s) on the left medial heel. The patient tolerated the procedure well and left the dermatology clinic in good condition.    Follow-up: 1 month(s) in-person, or earlier for new or changing lesions    Staff and Scribe:     Scribe Disclosure:   I, Romario Jimenez, am serving as a scribe to document services personally performed by this physician, Dr. Brandt Prince, based on data collection and the provider's statements to me.       Staff Physician Comments:   I saw and evaluated the patient with the resident (Yossi) and I agree with the assessment and plan as documented by the scribe. I was present  "for the entire procedure and examination.    Brandt Prince DO    Department of Dermatology  [facility]: Phone: [phone], Fax:[phone]  [facility]: Phone: [phone], Fax: [phone]    ____________________________________________    CC: Derm Problem (Jared is here today for wart follow up, pt states \"little improvement, no improvement on others\")    HPI:  Mr. Jared Cabrera is a(n) 31 year old male who presents today as a return patient for treatment of verruca.    Last seen 7/15/21 for verruca plantaris of the feet. 18 lesions were treated with cryo on the feet, and intralesional candida was injected into the right plantar foot. Cantharidin was also injected into one lesion on the right plantar foot.    Today, he notes he has seen minimal improvement since his previous visit. At home, he has been using Compound W gel daily.     Patient is otherwise feeling well, without additional skin concerns.    Labs Reviewed:  N/A    Physical Exam:  Vitals: There were no vitals taken for this visit.  SKIN: Focused examination of the feet was performed.  -There are verrucous papules with thrombosed capillaries interrupting dermatoglyphics on the plantar feet. 17 lesions were identified.   - No other lesions of concern on areas examined.     Medications:  No current outpatient medications on file.     Current Facility-Administered Medications   Medication     candida albicans skin test injection 0.1 mL      Past Medical History:   Previously healthy         "

## 2021-09-23 ENCOUNTER — OFFICE VISIT (OUTPATIENT)
Dept: DERMATOLOGY | Facility: CLINIC | Age: 31
End: 2021-09-23
Payer: COMMERCIAL

## 2021-09-23 DIAGNOSIS — B07.0 VERRUCA PLANTARIS: Primary | ICD-10-CM

## 2021-09-23 PROCEDURE — 17110 DESTRUCTION B9 LES UP TO 14: CPT | Performed by: DERMATOLOGY

## 2021-09-23 NOTE — PROGRESS NOTES
[facility] Dermatology Note  Encounter Date: Sep 23, 2021  Office Visit     Dermatology Problem List:  1. Verruca plantaris   current tx:   - 30% TCA 8/26/21  - cryo 5/5/21, 6/14/21, 7/15/21, 8/26/21, 9/23/21  - candida 6/14/21, 7/15/21, 8/26/21  - compound W     ____________________________________________     Assessment & Plan:     # Verruca plantaris - improvement noted compared to previously  - The lesions were first treated with gentle curettage. Cryotherapy was then performed (see procedure note below)  - Recommended HPV vaccine series.      Procedures Performed:   - Cryotherapy procedure note. After verbal consent and discussion of risks and benefits including, but not limited to, dyspigmentation/scar, blister, and pain, 6 lesion(s) was(were) treated with 1-2 mm freeze border for 1-2 cycles with liquid nitrogen. Post cryotherapy instructions were provided.    Follow-up: 8 week(s) in-person, or earlier for new or changing lesions    Staff and Scribe:     Scribe Disclosure:   I, Romario Jimenez, am serving as a scribe to document services personally performed by this physician, Dr. Brandt Prince, based on data collection and the provider's statements to me.     Provider Disclosure:   The documentation recorded by the scribe accurately reflects the services I personally performed and the decisions made by me.  I personally performed the procedures today.    Brandt Prince DO    Department of Dermatology  [facility]: Phone: [phone], Fax:[phone]  [facility]: Phone: [phone], Fax: [phone]    ____________________________________________    CC: Derm Problem (wart bilateral feet )    HPI:  Mr. Jared Cabrera is a(n) 31 year old male who presents today as a return patient for verruca treatment.    Last seen 8/26/21 for treatment of a verruca. At that time, the  lesion was treated with gentle curettage, cryo, candida injection and 30% TCA. He was instructed to remove the bandage and wash his foot after 4 hours.    Today, Jared reports improvement in his warts over the past few months. For 2-3 weeks after his last appointment, he continued doing Compound W treatments at home, but has since stopped. He has also been using gentle curettage at home.    Patient is otherwise feeling well, without additional skin concerns.    Labs Reviewed:  N/A    Physical Exam:  Vitals: There were no vitals taken for this visit.  SKIN: Focused examination of the plantar feet, bilaterally was performed.  - Right plantar foot with flat erythematous papules at sites of prior warts  - Plantar foot near base of big toe there is a small papule with thrombosed capillaries  - Left big toe with two residual papules with thrombosed capillaries, c/w small residual warts  - Left heel with one residual papule with thrombosed capillaries c/w small residual wart  - Right plantar foot with one residual papule with thrombosed capillaries c/w small residual war  - No other lesions of concern on areas examined.     Medications:  No current outpatient medications on file.     Current Facility-Administered Medications   Medication     candida albicans skin test injection 0.1 mL

## 2021-09-23 NOTE — PATIENT INSTRUCTIONS
Cryotherapy    What is it?    Use of a very cold liquid, such as liquid nitrogen, to freeze and destroy abnormal skin cells that need to be removed    What should I expect?    Tenderness and redness    A small blister that might grow and fill with dark purple blood. There may be crusting.    More than one treatment may be needed if the lesions do not go away.    How do I care for the treated area?    Gently wash the area with your hands when bathing.    Use a thin layer of Vaseline to help with healing. You may use a Band-Aid.     The area should heal within 7-10 days and may leave behind a pink or lighter color.     Do not use an antibiotic or Neosporin ointment.     You may take acetaminophen (Tylenol) for pain.     Call your doctor if you have:    Severe pain    Signs of infection (warmth, redness, cloudy yellow drainage, and or a bad smell)    Questions or concerns    Who should I call with questions?       [facility]: [phone]       [facility]: [phone]       For urgent needs outside of business hours call the [facility] at [phone] and ask for the dermatology resident on call    You can continue using your Compound W in between treatments, but plan to discontinue this 4 weeks prior to your next appoitnment

## 2021-09-23 NOTE — NURSING NOTE
Jared Cabrera's goals for this visit include:   Chief Complaint   Patient presents with     Derm Problem     wart bilateral feet      He requests these members of his care team be copied on today's visit information:     PCP: No Ref-Primary, Physician    Referring Provider:  Referred Self, MD  No address on file    There were no vitals taken for this visit.    Do you need any medication refills at today's visit? No    Cookie Almodovar, REGIS on 9/23/2021 at 3:36 PM

## 2021-09-23 NOTE — LETTER
9/23/2021         RE: Jared Cabrera  [address]  [place] [address]        Dear Colleague,    Thank you for referring your patient, Jared Cabrera, to the [facility]. Please see a copy of my visit note below.    [facility] Dermatology Note  Encounter Date: Sep 23, 2021  Office Visit     Dermatology Problem List:  1. Verruca plantaris   current tx:   - 30% TCA 8/26/21  - cryo 5/5/21, 6/14/21, 7/15/21, 8/26/21, 9/23/21  - candida 6/14/21, 7/15/21, 8/26/21  - compound W     ____________________________________________     Assessment & Plan:     # Verruca plantaris - improvement noted compared to previously  - The lesions were first treated with gentle curettage. Cryotherapy was then performed (see procedure note below)  - Recommended HPV vaccine series.      Procedures Performed:   - Cryotherapy procedure note. After verbal consent and discussion of risks and benefits including, but not limited to, dyspigmentation/scar, blister, and pain, 6 lesion(s) was(were) treated with 1-2 mm freeze border for 1-2 cycles with liquid nitrogen. Post cryotherapy instructions were provided.    Follow-up: 8 week(s) in-person, or earlier for new or changing lesions    Staff and Scribe:     Scribe Disclosure:   I, Romario Jimenez, am serving as a scribe to document services personally performed by this physician, Dr. Brandt Prince, based on data collection and the provider's statements to me.     Provider Disclosure:   The documentation recorded by the scribe accurately reflects the services I personally performed and the decisions made by me.  I personally performed the procedures today.    Brandt Prince DO    Department of Dermatology  [facility]: Phone: [phone], Fax:[phone]  [facility]: Phone: [phone], Fax:  [phone]    ____________________________________________    CC: Derm Problem (wart bilateral feet )    HPI:  Mr. Jared Cabrera is a(n) 31 year old male who presents today as a return patient for verruca treatment.    Last seen 8/26/21 for treatment of a verruca. At that time, the lesion was treated with gentle curettage, cryo, candida injection and 30% TCA. He was instructed to remove the bandage and wash his foot after 4 hours.    Today, Jared reports improvement in his warts over the past few months. For 2-3 weeks after his last appointment, he continued doing Compound W treatments at home, but has since stopped. He has also been using gentle curettage at home.    Patient is otherwise feeling well, without additional skin concerns.    Labs Reviewed:  N/A    Physical Exam:  Vitals: There were no vitals taken for this visit.  SKIN: Focused examination of the plantar feet, bilaterally was performed.  - Right plantar foot with flat erythematous papules at sites of prior warts  - Plantar foot near base of big toe there is a small papule with thrombosed capillaries  - Left big toe with two residual papules with thrombosed capillaries, c/w small residual warts  - Left heel with one residual papule with thrombosed capillaries c/w small residual wart  - Right plantar foot with one residual papule with thrombosed capillaries c/w small residual war  - No other lesions of concern on areas examined.     Medications:  No current outpatient medications on file.     Current Facility-Administered Medications   Medication     candida albicans skin test injection 0.1 mL          Again, thank you for allowing me to participate in the care of your patient.        Sincerely,        Brandt Prince MD

## 2021-10-10 ENCOUNTER — HEALTH MAINTENANCE LETTER (OUTPATIENT)
Age: 31
End: 2021-10-10

## 2021-11-17 ENCOUNTER — OFFICE VISIT (OUTPATIENT)
Dept: URGENT CARE | Facility: URGENT CARE | Age: 31
End: 2021-11-17
Payer: COMMERCIAL

## 2021-11-17 VITALS
TEMPERATURE: 97.5 F | DIASTOLIC BLOOD PRESSURE: 71 MMHG | SYSTOLIC BLOOD PRESSURE: 121 MMHG | HEART RATE: 51 BPM | OXYGEN SATURATION: 97 % | WEIGHT: 184.4 LBS

## 2021-11-17 DIAGNOSIS — R50.9 FEVER, UNSPECIFIED: Primary | ICD-10-CM

## 2021-11-17 LAB
FLUAV AG SPEC QL IA: NEGATIVE
FLUBV AG SPEC QL IA: NEGATIVE
SARS-COV-2 RNA RESP QL NAA+PROBE: POSITIVE

## 2021-11-17 PROCEDURE — U0003 INFECTIOUS AGENT DETECTION BY NUCLEIC ACID (DNA OR RNA); SEVERE ACUTE RESPIRATORY SYNDROME CORONAVIRUS 2 (SARS-COV-2) (CORONAVIRUS DISEASE [COVID-19]), AMPLIFIED PROBE TECHNIQUE, MAKING USE OF HIGH THROUGHPUT TECHNOLOGIES AS DESCRIBED BY CMS-2020-01-R: HCPCS | Performed by: FAMILY MEDICINE

## 2021-11-17 PROCEDURE — 87804 INFLUENZA ASSAY W/OPTIC: CPT | Performed by: FAMILY MEDICINE

## 2021-11-17 PROCEDURE — 99213 OFFICE O/P EST LOW 20 MIN: CPT | Performed by: FAMILY MEDICINE

## 2021-11-17 PROCEDURE — U0005 INFEC AGEN DETEC AMPLI PROBE: HCPCS | Performed by: FAMILY MEDICINE

## 2021-11-17 NOTE — PATIENT INSTRUCTIONS
ibuprofen for achiness and pain and fever    Pseudoephedrine, guaifenesin, afrin (oxymetolazone-max 3 days) and hot steamy beverages and soups and showers for congestion.

## 2021-12-17 ENCOUNTER — IMMUNIZATION (OUTPATIENT)
Dept: NURSING | Facility: CLINIC | Age: 31
End: 2021-12-17
Payer: COMMERCIAL

## 2021-12-17 PROCEDURE — 0004A PR COVID VAC PFIZER DIL RECON 30 MCG/0.3 ML IM: CPT

## 2021-12-17 PROCEDURE — 91300 PR COVID VAC PFIZER DIL RECON 30 MCG/0.3 ML IM: CPT

## 2022-01-30 ENCOUNTER — HEALTH MAINTENANCE LETTER (OUTPATIENT)
Age: 32
End: 2022-01-30

## 2022-02-21 ENCOUNTER — ANCILLARY PROCEDURE (OUTPATIENT)
Dept: MRI IMAGING | Facility: CLINIC | Age: 32
End: 2022-02-21
Payer: COMMERCIAL

## 2022-02-21 DIAGNOSIS — S89.90XA KNEE INJURY: ICD-10-CM

## 2022-02-21 DIAGNOSIS — T14.8XXA SPRAIN AND STRAIN: ICD-10-CM

## 2022-02-21 PROCEDURE — 73721 MRI JNT OF LWR EXTRE W/O DYE: CPT | Mod: LT | Performed by: RADIOLOGY

## 2022-05-04 ENCOUNTER — ALLIED HEALTH/NURSE VISIT (OUTPATIENT)
Dept: FAMILY MEDICINE | Facility: CLINIC | Age: 32
End: 2022-05-04
Payer: COMMERCIAL

## 2022-05-04 DIAGNOSIS — Z23 NEED FOR PROPHYLACTIC VACCINATION AND INOCULATION AGAINST INFLUENZA: Primary | ICD-10-CM

## 2022-05-04 DIAGNOSIS — Z23 NEED FOR VACCINATION: ICD-10-CM

## 2022-05-04 PROCEDURE — 99207 PR NO CHARGE NURSE ONLY: CPT

## 2022-05-04 PROCEDURE — 90471 IMMUNIZATION ADMIN: CPT

## 2022-05-04 PROCEDURE — 90715 TDAP VACCINE 7 YRS/> IM: CPT

## 2022-05-04 NOTE — NURSING NOTE
MA checked with provider (Dr. Rojas) to ensure Tdap could be given. Provider approved. Last Tdap given on 08/20/2012.   -Pamela Kaminski CMA

## 2022-05-04 NOTE — NURSING NOTE
Prior to immunization administration, verified patients identity using patient s name and date of birth. Please see Immunization Activity for additional information.     Screening Questionnaire for Adult Immunization    Are you sick today?   No   Do you have allergies to medications, food, a vaccine component or latex?   No   Have you ever had a serious reaction after receiving a vaccination?   No   Do you have a long-term health problem with heart, lung, kidney, or metabolic disease (e.g., diabetes), asthma, a blood disorder, no spleen, complement component deficiency, a cochlear implant, or a spinal fluid leak?  Are you on long-term aspirin therapy?   No   Do you have cancer, leukemia, HIV/AIDS, or any other immune system problem?   No   Do you have a parent, brother, or sister with an immune system problem?   No   In the past 3 months, have you taken medications that affect  your immune system, such as prednisone, other steroids, or anticancer drugs; drugs for the treatment of rheumatoid arthritis, Crohn s disease, or psoriasis; or have you had radiation treatments?   No   Have you had a seizure, or a brain or other nervous system problem?   No   During the past year, have you received a transfusion of blood or blood    products, or been given immune (gamma) globulin or antiviral drug?   No   For women: Are you pregnant or is there a chance you could become       pregnant during the next month?   No   Have you received any vaccinations in the past 4 weeks?   No     Immunization questionnaire answers were all negative.        Per orders of Dr. Rojas, injection of Tdap given by Pamela Kaminski. Patient instructed to remain in clinic for 15 minutes afterwards, and to report any adverse reaction to me immediately.       Screening performed by Pamela Kaminski on 5/4/2022 at 3:57 PM.

## 2022-09-18 ENCOUNTER — HEALTH MAINTENANCE LETTER (OUTPATIENT)
Age: 32
End: 2022-09-18

## 2022-12-02 ENCOUNTER — IMMUNIZATION (OUTPATIENT)
Dept: NURSING | Facility: CLINIC | Age: 32
End: 2022-12-02
Payer: COMMERCIAL

## 2022-12-02 PROCEDURE — 90471 IMMUNIZATION ADMIN: CPT

## 2022-12-02 PROCEDURE — 0124A COVID-19 VACCINE BIVALENT BOOSTER 12+ (PFIZER): CPT

## 2022-12-02 PROCEDURE — 90686 IIV4 VACC NO PRSV 0.5 ML IM: CPT

## 2022-12-02 PROCEDURE — 91312 COVID-19 VACCINE BIVALENT BOOSTER 12+ (PFIZER): CPT

## 2023-03-29 ENCOUNTER — TELEPHONE (OUTPATIENT)
Dept: URGENT CARE | Facility: CLINIC | Age: 33
End: 2023-03-29

## 2023-03-29 ENCOUNTER — E-VISIT (OUTPATIENT)
Dept: URGENT CARE | Facility: CLINIC | Age: 33
End: 2023-03-29
Payer: COMMERCIAL

## 2023-03-29 ENCOUNTER — LAB (OUTPATIENT)
Dept: URGENT CARE | Facility: URGENT CARE | Age: 33
End: 2023-03-29
Attending: PHYSICIAN ASSISTANT
Payer: COMMERCIAL

## 2023-03-29 DIAGNOSIS — J02.9 SORE THROAT: ICD-10-CM

## 2023-03-29 DIAGNOSIS — Z20.822 SUSPECTED COVID-19 VIRUS INFECTION: ICD-10-CM

## 2023-03-29 DIAGNOSIS — J02.0 STREPTOCOCCAL PHARYNGITIS: Primary | ICD-10-CM

## 2023-03-29 LAB
DEPRECATED S PYO AG THROAT QL EIA: NEGATIVE
GROUP A STREP BY PCR: DETECTED
SARS-COV-2 RNA RESP QL NAA+PROBE: NEGATIVE

## 2023-03-29 PROCEDURE — 99421 OL DIG E/M SVC 5-10 MIN: CPT | Mod: CS | Performed by: PHYSICIAN ASSISTANT

## 2023-03-29 PROCEDURE — 87651 STREP A DNA AMP PROBE: CPT

## 2023-03-29 PROCEDURE — U0005 INFEC AGEN DETEC AMPLI PROBE: HCPCS

## 2023-03-29 PROCEDURE — U0003 INFECTIOUS AGENT DETECTION BY NUCLEIC ACID (DNA OR RNA); SEVERE ACUTE RESPIRATORY SYNDROME CORONAVIRUS 2 (SARS-COV-2) (CORONAVIRUS DISEASE [COVID-19]), AMPLIFIED PROBE TECHNIQUE, MAKING USE OF HIGH THROUGHPUT TECHNOLOGIES AS DESCRIBED BY CMS-2020-01-R: HCPCS

## 2023-03-29 RX ORDER — AMOXICILLIN 875 MG
875 TABLET ORAL 2 TIMES DAILY
Qty: 20 TABLET | Refills: 0 | Status: SHIPPED | OUTPATIENT
Start: 2023-03-29 | End: 2023-04-08

## 2023-03-29 NOTE — PROGRESS NOTES
COVID-19 PCR/Strep tests completed. Patient handout For Patients Who Have Been Tested for Covid-19 (Coronavirus) was given to the patient, which includes test result notification process.

## 2023-03-29 NOTE — PATIENT INSTRUCTIONS
Dear Jared,    Your symptoms show that you may have COVID-19. This illness can cause fever, cough and trouble breathing. Many people get a mild case and get better on their own. Some people can get very sick.    Because you also reported sore throat, I would like to also test you for Strep Throat to determine if we need to treat you for that as well.    What should I do?  For all employees or close contacts (except [place] and [employer] - see below), go to your [employer] home page and scroll down to the section that says  You have an appointment that needs to be scheduled  and click the large green button that says  Schedule Now  and follow the steps to find the next available opening.  It is important that when you are asked what the reason for your appointment is that you mention you need BOTH Covid and Strep tests.    If you are unable to complete these steps or if you cannot find any available times, please call [phone] to schedule employee testing.     [facility] employees or close contacts, please call [phone].   [place] ([facility]) employees or close contacts call [phone].    Return to work guidance:   Please let your workplace manager and staffing office know when your isolation ends.   Note: if you tested through [facility], there is no need to report to [facility]. If you did not test through [facility], send a copy of your results to [email or web address]. [place] [facility] call [phone],  [place] call [phone].     Please visit the Employee COVID-19 Testing Information page on the COVID-19 [employer] site. Here you will find return to work and testing guidance, high and low risk exposure definitions, and frequently asked questions.   [employer] URL: [email or web address]     How can I take care of myself?  Over the counter medications may help with your symptoms such as runny or stuffy nose, cough, chills, or  fever.  Talk to your care team about your options.     Some people are at high risk of severe illness (for example, you have a weak immune system, you re 65 years or older, or you have certain medical problems). If your risk is high and your symptoms started in the last 5 days, we strongly recommend for you to get COVID treatment as soon as possible. Paxlovid and Molnupiravir are proven safe and effective, make you feel better faster, and prevent hospitalization and death.       To schedule an appointment to discuss COVID treatment, request an appointment on [employer] (select  COVID-19 Treatment ) or call [address] [place] [address] ([phone]).      Get lots of rest. Drink extra fluids (unless a doctor has told you not to)    Take Tylenol (acetaminophen) or ibuprofen for fever or pain. If you have liver or kidney problems, ask your family doctor if it's okay to take Tylenol or ibuprofen    Take over the counter medications for your symptoms, as directed by your doctor. You may also talk to your pharmacist.      If you have other health problems (like cancer, heart failure, an organ transplant or severe kidney disease): Call your specialty clinic if you don't feel better in the next 2 days.    Know when to call 911. Emergency warning signs include:  o Trouble breathing or shortness of breath  o Pain or pressure in the chest that doesn't go away  o Feeling confused like you haven't felt before, or not being able to wake up  o Bluish-colored lips or face  Where can I get more information?    [facility] - About COVID-19: [email or web address]     CDC - What to Do If You're Sick: www.cdc.gov/coronavirus/2019-ncov/about/steps-when-sick.html    CDC -  Isolation https://www.cdc.gov/coronavirus/2019-ncov/your-health/isolation.html

## 2023-05-08 ENCOUNTER — HEALTH MAINTENANCE LETTER (OUTPATIENT)
Age: 33
End: 2023-05-08

## 2023-12-12 ASSESSMENT — ENCOUNTER SYMPTOMS
CONSTIPATION: 0
EYE PAIN: 0
JOINT SWELLING: 0
HEARTBURN: 0
WEAKNESS: 0
MYALGIAS: 0
HEADACHES: 0
FEVER: 0
PARESTHESIAS: 0
NAUSEA: 0
CHILLS: 0
DIZZINESS: 0
HEMATURIA: 0
DIARRHEA: 0
DYSURIA: 0
SHORTNESS OF BREATH: 0
FREQUENCY: 0
HEMATOCHEZIA: 0
COUGH: 0
ABDOMINAL PAIN: 0
NERVOUS/ANXIOUS: 0
SORE THROAT: 0
PALPITATIONS: 0
ARTHRALGIAS: 0

## 2023-12-15 ENCOUNTER — OFFICE VISIT (OUTPATIENT)
Dept: FAMILY MEDICINE | Facility: CLINIC | Age: 33
End: 2023-12-15
Payer: COMMERCIAL

## 2023-12-15 VITALS
OXYGEN SATURATION: 98 % | HEART RATE: 60 BPM | BODY MASS INDEX: 24.57 KG/M2 | HEIGHT: 72 IN | SYSTOLIC BLOOD PRESSURE: 136 MMHG | TEMPERATURE: 97.9 F | RESPIRATION RATE: 20 BRPM | WEIGHT: 181.4 LBS | DIASTOLIC BLOOD PRESSURE: 74 MMHG

## 2023-12-15 DIAGNOSIS — Z13.220 SCREENING FOR HYPERLIPIDEMIA: ICD-10-CM

## 2023-12-15 DIAGNOSIS — Z13.1 SCREENING FOR DIABETES MELLITUS: ICD-10-CM

## 2023-12-15 DIAGNOSIS — Z82.49 FAMILY HISTORY OF ISCHEMIC HEART DISEASE: ICD-10-CM

## 2023-12-15 DIAGNOSIS — Z00.00 ROUTINE GENERAL MEDICAL EXAMINATION AT A HEALTH CARE FACILITY: Primary | ICD-10-CM

## 2023-12-15 PROCEDURE — 99213 OFFICE O/P EST LOW 20 MIN: CPT | Mod: 25 | Performed by: INTERNAL MEDICINE

## 2023-12-15 PROCEDURE — 99395 PREV VISIT EST AGE 18-39: CPT | Performed by: INTERNAL MEDICINE

## 2023-12-15 ASSESSMENT — ENCOUNTER SYMPTOMS
COUGH: 0
FEVER: 0
MYALGIAS: 0
EYE PAIN: 0
FREQUENCY: 0
CHILLS: 0
ARTHRALGIAS: 0
PARESTHESIAS: 0
PALPITATIONS: 0
SORE THROAT: 0
SHORTNESS OF BREATH: 0
CONSTIPATION: 0
WEAKNESS: 0
HEARTBURN: 0
HEADACHES: 0
DIZZINESS: 0
DYSURIA: 0
NAUSEA: 0
HEMATURIA: 0
JOINT SWELLING: 0
DIARRHEA: 0
HEMATOCHEZIA: 0
ABDOMINAL PAIN: 0
NERVOUS/ANXIOUS: 0

## 2023-12-15 NOTE — PROGRESS NOTES
"SUBJECTIVE:   Jared is a 33 year old, presenting for the following:  Physical        12/15/2023     8:52 AM   Additional Questions   Roomed by Real       Healthy Habits:     Getting at least 3 servings of Calcium per day:  Yes    Bi-annual eye exam:  NO    Dental care twice a year:  Yes    Sleep apnea or symptoms of sleep apnea:  None    Diet:  Regular (no restrictions)    Frequency of exercise:  6-7 days/week    Duration of exercise:  Greater than 60 minutes    Taking medications regularly:  Yes    Medication side effects:  None    Additional concerns today:  No      Today's PHQ-2 Score:       12/15/2023     8:34 AM   PHQ-2 ( 1999 Pfizer)   Q1: Little interest or pleasure in doing things 0   Q2: Feeling down, depressed or hopeless 0   PHQ-2 Score 0   Q1: Little interest or pleasure in doing things Not at all   Q2: Feeling down, depressed or hopeless Not at all   PHQ-2 Score 0                   Have you ever done Advance Care Planning? (For example, a Health Directive, POLST, or a discussion with a medical provider or your loved ones about your wishes): Yes, patient states has an Advance Care Planning document and will bring a copy to the clinic.    Social History     Tobacco Use    Smoking status: Never    Smokeless tobacco: Never   Substance Use Topics    Alcohol use: Yes             12/12/2023     8:07 AM   Alcohol Use   Prescreen: >3 drinks/day or >7 drinks/week? No          No data to display                Last PSA: No results found for: \"PSA\"    Reviewed orders with patient. Reviewed health maintenance and updated orders accordingly - Yes  Lab work is in process    Reviewed and updated as needed this visit by clinical staff   Tobacco  Allergies  Meds              Reviewed and updated as needed this visit by Provider                     Review of Systems   Constitutional:  Negative for chills and fever.   HENT:  Negative for congestion, ear pain, hearing loss and sore throat.    Eyes:  Negative for pain and " visual disturbance.   Respiratory:  Negative for cough and shortness of breath.    Cardiovascular:  Negative for chest pain, palpitations and peripheral edema.   Gastrointestinal:  Negative for abdominal pain, constipation, diarrhea, heartburn, hematochezia and nausea.   Genitourinary:  Negative for dysuria, frequency, genital sores, hematuria, impotence, penile discharge and urgency.   Musculoskeletal:  Negative for arthralgias, joint swelling and myalgias.   Skin:  Negative for rash.   Neurological:  Negative for dizziness, weakness, headaches and paresthesias.   Psychiatric/Behavioral:  Negative for mood changes. The patient is not nervous/anxious.          OBJECTIVE:   /74 (BP Location: Left arm, Patient Position: Sitting, Cuff Size: Adult Large)   Pulse 60   Temp 97.9  F (36.6  C) (Oral)   Resp 20   Ht 1.829 m (6')   Wt 82.3 kg (181 lb 6.4 oz)   SpO2 98%   BMI 24.60 kg/m      Physical Exam  GENERAL: healthy, alert and no distress  EYES: Eyes grossly normal to inspection, PERRL and conjunctivae and sclerae normal  HENT: ear canals and TM's normal, nose and mouth without ulcers or lesions  NECK: no adenopathy, no asymmetry, masses, or scars and thyroid normal to palpation  RESP: lungs clear to auscultation - no rales, rhonchi or wheezes  CV: regular rate and rhythm, normal S1 S2, no S3 or S4, no murmur, click or rub, no peripheral edema and peripheral pulses strong  ABDOMEN: soft, nontender, no hepatosplenomegaly, no masses and bowel sounds normal  MS: no gross musculoskeletal defects noted, no edema  SKIN: no suspicious lesions or rashes  NEURO: Normal strength and tone, mentation intact and speech normal  PSYCH: mentation appears normal, affect normal/bright    Diagnostic Test Results:  Labs reviewed in Epic    ASSESSMENT/PLAN:   (Z00.00) Routine general medical examination at a health care facility  (primary encounter diagnosis)  Comment: He is up-to-date with all the vaccines except COVID  He is  going to get it soon  He exercises regularly and runs up to 45 miles a week  BMI is in the normal range  No family history of any colon cancer or prostate cancer  Systolic  blood pressure is slightly high in the prehypertension range  Advised to monitor it at home  Plan: Comprehensive metabolic panel (BMP + Alb, Alk         Phos, ALT, AST, Total. Bili, TP), CBC with         platelets and differential            (Z13.220) Screening for hyperlipidemia  Comment:   Plan: Lipid panel reflex to direct LDL Non-fasting            (Z13.1) Screening for diabetes mellitus  Comment:   Plan: Hemoglobin A1c            (Z82.49) Family history of ischemic heart disease  Comment: He does have a strong family history of CAD  His grandfather had coronary artery disease and passed away when he was 45  His dad is also got coronary artery disease and had a bypass in his 60s  Obviously this is concerning  I would proceed and get a CT calcium score as if this is elevated then we can start him on a statin and also work towards primary prevention  Plan: CT Coronary Calcium Scan            Patient has been advised of split billing requirements and indicates understanding: No      COUNSELING:   Reviewed preventive health counseling, as reflected in patient instructions       Regular exercise       Healthy diet/nutrition       Vision screening        He reports that he has never smoked. He has never used smokeless tobacco.            Adrian Rangel MD  [facility]

## 2023-12-18 ENCOUNTER — LAB (OUTPATIENT)
Dept: LAB | Facility: CLINIC | Age: 33
End: 2023-12-18
Payer: COMMERCIAL

## 2023-12-18 DIAGNOSIS — Z82.49 FAMILY HISTORY OF ISCHEMIC HEART DISEASE: ICD-10-CM

## 2023-12-18 DIAGNOSIS — Z13.220 SCREENING FOR HYPERLIPIDEMIA: ICD-10-CM

## 2023-12-18 DIAGNOSIS — R93.1 ELEVATED CORONARY ARTERY CALCIUM SCORE: ICD-10-CM

## 2023-12-18 DIAGNOSIS — Z00.00 ROUTINE GENERAL MEDICAL EXAMINATION AT A HEALTH CARE FACILITY: ICD-10-CM

## 2023-12-18 DIAGNOSIS — Z13.1 SCREENING FOR DIABETES MELLITUS: ICD-10-CM

## 2023-12-18 LAB
ALBUMIN SERPL BCG-MCNC: 4.7 G/DL (ref 3.5–5.2)
ALP SERPL-CCNC: 61 U/L (ref 40–150)
ALT SERPL W P-5'-P-CCNC: 22 U/L (ref 0–70)
ANION GAP SERPL CALCULATED.3IONS-SCNC: 7 MMOL/L (ref 7–15)
AST SERPL W P-5'-P-CCNC: 28 U/L (ref 0–45)
BASOPHILS # BLD AUTO: 0 10E3/UL (ref 0–0.2)
BASOPHILS NFR BLD AUTO: 1 %
BILIRUB SERPL-MCNC: 0.4 MG/DL
BUN SERPL-MCNC: 14.1 MG/DL (ref 6–20)
CALCIUM SERPL-MCNC: 9.6 MG/DL (ref 8.6–10)
CHLORIDE SERPL-SCNC: 103 MMOL/L (ref 98–107)
CHOLEST SERPL-MCNC: 197 MG/DL
CREAT SERPL-MCNC: 0.87 MG/DL (ref 0.67–1.17)
DEPRECATED HCO3 PLAS-SCNC: 29 MMOL/L (ref 22–29)
EGFRCR SERPLBLD CKD-EPI 2021: >90 ML/MIN/1.73M2
EOSINOPHIL # BLD AUTO: 0.3 10E3/UL (ref 0–0.7)
EOSINOPHIL NFR BLD AUTO: 9 %
ERYTHROCYTE [DISTWIDTH] IN BLOOD BY AUTOMATED COUNT: 12.3 % (ref 10–15)
FASTING STATUS PATIENT QL REPORTED: YES
GLUCOSE SERPL-MCNC: 100 MG/DL (ref 70–99)
HBA1C MFR BLD: 5.4 % (ref 0–5.6)
HCT VFR BLD AUTO: 41.6 % (ref 40–53)
HDLC SERPL-MCNC: 54 MG/DL
HGB BLD-MCNC: 14.2 G/DL (ref 13.3–17.7)
IMM GRANULOCYTES # BLD: 0 10E3/UL
IMM GRANULOCYTES NFR BLD: 0 %
LDLC SERPL CALC-MCNC: 131 MG/DL
LYMPHOCYTES # BLD AUTO: 1.4 10E3/UL (ref 0.8–5.3)
LYMPHOCYTES NFR BLD AUTO: 39 %
MCH RBC QN AUTO: 27.4 PG (ref 26.5–33)
MCHC RBC AUTO-ENTMCNC: 34.1 G/DL (ref 31.5–36.5)
MCV RBC AUTO: 80 FL (ref 78–100)
MONOCYTES # BLD AUTO: 0.2 10E3/UL (ref 0–1.3)
MONOCYTES NFR BLD AUTO: 6 %
NEUTROPHILS # BLD AUTO: 1.6 10E3/UL (ref 1.6–8.3)
NEUTROPHILS NFR BLD AUTO: 45 %
NONHDLC SERPL-MCNC: 143 MG/DL
NRBC # BLD AUTO: 0 10E3/UL
NRBC BLD AUTO-RTO: 0 /100
PLATELET # BLD AUTO: 177 10E3/UL (ref 150–450)
POTASSIUM SERPL-SCNC: 4.2 MMOL/L (ref 3.4–5.3)
PROT SERPL-MCNC: 7.1 G/DL (ref 6.4–8.3)
RBC # BLD AUTO: 5.18 10E6/UL (ref 4.4–5.9)
SODIUM SERPL-SCNC: 139 MMOL/L (ref 135–145)
TRIGL SERPL-MCNC: 58 MG/DL
WBC # BLD AUTO: 3.6 10E3/UL (ref 4–11)

## 2023-12-18 PROCEDURE — 85025 COMPLETE CBC W/AUTO DIFF WBC: CPT

## 2023-12-18 PROCEDURE — 80061 LIPID PANEL: CPT

## 2023-12-18 PROCEDURE — 86141 C-REACTIVE PROTEIN HS: CPT | Mod: 90

## 2023-12-18 PROCEDURE — 80053 COMPREHEN METABOLIC PANEL: CPT

## 2023-12-18 PROCEDURE — 83036 HEMOGLOBIN GLYCOSYLATED A1C: CPT

## 2023-12-18 PROCEDURE — 82172 ASSAY OF APOLIPOPROTEIN: CPT | Mod: 90

## 2023-12-18 PROCEDURE — 36415 COLL VENOUS BLD VENIPUNCTURE: CPT

## 2023-12-22 ENCOUNTER — ANCILLARY PROCEDURE (OUTPATIENT)
Dept: CT IMAGING | Facility: CLINIC | Age: 33
End: 2023-12-22
Attending: INTERNAL MEDICINE
Payer: COMMERCIAL

## 2023-12-22 ENCOUNTER — MYC MEDICAL ADVICE (OUTPATIENT)
Dept: FAMILY MEDICINE | Facility: CLINIC | Age: 33
End: 2023-12-22

## 2023-12-22 DIAGNOSIS — R93.1 ELEVATED CORONARY ARTERY CALCIUM SCORE: ICD-10-CM

## 2023-12-22 DIAGNOSIS — Z82.49 FAMILY HISTORY OF ISCHEMIC HEART DISEASE: ICD-10-CM

## 2023-12-22 DIAGNOSIS — Z82.49 FAMILY HISTORY OF ISCHEMIC HEART DISEASE: Primary | ICD-10-CM

## 2023-12-22 PROCEDURE — 75571 CT HRT W/O DYE W/CA TEST: CPT | Performed by: INTERNAL MEDICINE

## 2023-12-22 NOTE — TELEPHONE ENCOUNTER
See MyChart encounter below. Routing to provider to review and advise.    Believe this is re: CT screen today? Please let us know if there's anything RNs can help with, thank you!      GENET StringerN, RN  [facility]

## 2023-12-23 LAB — CRP SERPL HS-MCNC: 0.89 MG/L

## 2023-12-24 LAB
APO A-I SERPL-MCNC: 138 MG/DL
APO B100 SERPL-MCNC: 107 MG/DL

## 2024-01-25 ENCOUNTER — NURSE TRIAGE (OUTPATIENT)
Dept: FAMILY MEDICINE | Facility: CLINIC | Age: 34
End: 2024-01-25
Payer: COMMERCIAL

## 2024-01-25 NOTE — TELEPHONE ENCOUNTER
Patient calling to report he tested positive for Covid at home on 1/24/2024. Symptoms started either on Monday or Tuesday.     Current symptoms include nasal congestion, mild cough, sore throat, fatigue. Denies any chest pain, shortness of breath, nausea, emesis, or diarrhea.     Would like to discuss if he qualifies for Paxlovid. Routing to Covid Hub RN team to further assist.     TUNG Hand  [facility] Triage

## 2024-01-25 NOTE — TELEPHONE ENCOUNTER
RN COVID TREATMENT VISIT  01/25/24      The patient has been triaged and does not require a higher level of care.    Jared Cabrera  34 year old  Current weight? 181 lbs    Has the patient been seen by a primary care provider at an [facility] or [facility] within the past two years? Yes.   Have you been in close proximity to/do you have a known exposure to a person with a confirmed case of influenza? No.     General treatment eligibility:  Date of positive COVID test (PCR or at home)?  1/224/24    Are you or have you been hospitalized for this COVID-19 infection? No.   Have you received monoclonal antibodies or antiviral treatment for COVID-19 since this positive test? No.   Do you have any of the following conditions that place you at risk of being very sick from COVID-19?   No high risk conditions. Patient informed they do not qualify for treatment. Nurse advised patient to call [phone] if he has any questions about covid or Paxlovid.   Sterling Alejo RN         Reason for Disposition   COVID-19 diagnosed by positive lab test (e.g., PCR, rapid self-test kit) and mild symptoms (e.g., cough, fever, others) and no complications or SOB    Additional Information   Negative: SEVERE difficulty breathing (e.g., struggling for each breath, speaks in single words)   Negative: Difficult to awaken or acting confused (e.g., disoriented, slurred speech)   Negative: Bluish (or gray) lips or face now   Negative: Shock suspected (e.g., cold/pale/clammy skin, too weak to stand, low BP, rapid pulse)   Negative: Sounds like a life-threatening emergency to the triager   Negative: SEVERE or constant chest pain or pressure  (Exception: Mild central chest pain, present only when coughing.)   Negative: MODERATE difficulty breathing (e.g., speaks in phrases, SOB even at rest, pulse 100-120)   Negative: Headache and stiff neck (can't touch chin to chest)   Negative: Oxygen level (e.g., pulse oximetry)  90% or lower   Negative: Chest pain or pressure  (Exception: MILD central chest pain, present only when coughing.)   Negative: Drinking very little and dehydration suspected (e.g., no urine > 12 hours, very dry mouth, very lightheaded)   Negative: Patient sounds very sick or weak to the triager   Negative: MILD difficulty breathing (e.g., minimal/no SOB at rest, SOB with walking, pulse <100)   Negative: Fever > 103 F (39.4 C)   Negative: Fever > 101 F (38.3 C) and over 60 years of age   Negative: Fever > 100.0 F (37.8 C) and bedridden (e.g., CVA, chronic illness, recovering from surgery)   Negative: HIGH RISK patient (e.g., weak immune system, age > 64 years, obesity with BMI of 30 or higher, pregnant, chronic lung disease or other chronic medical condition) and COVID symptoms (e.g., cough, fever)  (Exceptions: Already seen by doctor or NP/PA and no new or worsening symptoms.)   Negative: HIGH RISK patient and influenza is widespread in the community and ONE OR MORE respiratory symptoms: cough, sore throat, runny or stuffy nose   Negative: HIGH RISK patient and influenza exposure within the last 7 days and ONE OR MORE respiratory symptoms: cough, sore throat, runny or stuffy nose   Negative: Oxygen level (e.g., pulse oximetry) 91 to 94%   Negative: COVID-19 infection suspected by caller or triager and mild symptoms (cough, fever, or others) and negative COVID-19 rapid test   Negative: Fever present > 3 days (72 hours)   Negative: Fever returns after gone for over 24 hours and symptoms worse or not improved   Negative: Continuous (nonstop) coughing interferes with work or school and no improvement using cough treatment per Care Advice   Negative: Cough present > 3 weeks   Negative: COVID-19 diagnosed by positive lab test (e.g., PCR, rapid self-test kit) and NO symptoms (e.g., cough, fever, others)    Protocols used: Coronavirus (COVID-19) Diagnosed or [id number]

## 2024-02-25 ENCOUNTER — NURSE TRIAGE (OUTPATIENT)
Dept: NURSING | Facility: CLINIC | Age: 34
End: 2024-02-25

## 2024-02-25 ENCOUNTER — OFFICE VISIT (OUTPATIENT)
Dept: FAMILY MEDICINE | Facility: CLINIC | Age: 34
End: 2024-02-25
Payer: COMMERCIAL

## 2024-02-25 ENCOUNTER — TELEPHONE (OUTPATIENT)
Dept: FAMILY MEDICINE | Facility: CLINIC | Age: 34
End: 2024-02-25

## 2024-02-25 VITALS
DIASTOLIC BLOOD PRESSURE: 78 MMHG | OXYGEN SATURATION: 98 % | WEIGHT: 183 LBS | TEMPERATURE: 97.5 F | HEART RATE: 53 BPM | BODY MASS INDEX: 24.82 KG/M2 | SYSTOLIC BLOOD PRESSURE: 130 MMHG

## 2024-02-25 DIAGNOSIS — M25.511 TRIGGER POINT OF RIGHT SHOULDER REGION: Primary | ICD-10-CM

## 2024-02-25 DIAGNOSIS — S29.012A UPPER BACK STRAIN, INITIAL ENCOUNTER: ICD-10-CM

## 2024-02-25 PROCEDURE — 99213 OFFICE O/P EST LOW 20 MIN: CPT | Mod: 25 | Performed by: STUDENT IN AN ORGANIZED HEALTH CARE EDUCATION/TRAINING PROGRAM

## 2024-02-25 PROCEDURE — 20553 NJX 1/MLT TRIGGER POINTS 3/>: CPT | Performed by: STUDENT IN AN ORGANIZED HEALTH CARE EDUCATION/TRAINING PROGRAM

## 2024-02-25 RX ORDER — CYCLOBENZAPRINE HCL 10 MG
10 TABLET ORAL 3 TIMES DAILY PRN
Qty: 30 TABLET | Refills: 0 | Status: SHIPPED | OUTPATIENT
Start: 2024-02-25 | End: 2024-05-29

## 2024-02-25 RX ORDER — CYCLOBENZAPRINE HCL 10 MG
10 TABLET ORAL 3 TIMES DAILY PRN
Qty: 30 TABLET | Refills: 0 | Status: SHIPPED | OUTPATIENT
Start: 2024-02-25 | End: 2024-02-25

## 2024-02-25 NOTE — TELEPHONE ENCOUNTER
FYI - Status Update    Who is Calling: patient    Update:  RX need to efax to pt pharmacy  :cyclobenzaprine (FLEXERIL)     [place]     [facility] [place]    [id number] [place] [address]    Does caller want a call/response back: Yes     Could we send this information to you in [facility] or would you prefer to receive a phone call?:   Patient would prefer a phone call   Okay to leave a detailed message?: Yes at Cell number on file:    Telephone Information:   Mobile [phone]

## 2024-02-25 NOTE — TELEPHONE ENCOUNTER
Reason for Disposition   [1] Prescription not at pharmacy AND [2] was prescribed by PCP recently (Exception: Triager has access to EMR and prescription is recorded there. Go to Home Care and confirm for pharmacy.)    Additional Information   Negative: [1] Intentional drug overdose AND [2] suicidal thoughts or ideas   Negative: Drug overdose and triager unable to answer question   Negative: Caller requesting a renewal or refill of a medicine patient is currently taking   Negative: Caller requesting information unrelated to medicine   Negative: Caller requesting information about COVID-19 Vaccine   Negative: Caller requesting information about Emergency Contraception   Negative: Caller requesting information about Combined Birth Control Pills   Negative: Caller requesting information about Progestin Birth Control Pills   Negative: Caller requesting information about Post-Op pain or medicines   Negative: Caller requesting a prescription antibiotic (such as Penicillin) for Strep throat and has a positive culture result   Negative: Caller requesting a prescription anti-viral med (such as Tamiflu) and has influenza (flu) symptoms   Negative: Immunization reaction suspected   Negative: Rash while taking a medicine or within 3 days of stopping it   Negative: [1] Asthma and [2] having symptoms of asthma (cough, wheezing, etc.)   Negative: [1] Symptom of illness (e.g., headache, abdominal pain, earache, vomiting) AND [2] more than mild   Negative: Breastfeeding questions about mother's medicines and diet   Negative: MORE THAN A DOUBLE DOSE of a prescription or over-the-counter (OTC) drug   Negative: [1] DOUBLE DOSE (an extra dose or lesser amount) of prescription drug AND [2] any symptoms (e.g., dizziness, nausea, pain, sleepiness)   Negative: [1] DOUBLE DOSE (an extra dose or lesser amount) of over-the-counter (OTC) drug AND [2] any symptoms (e.g., dizziness, nausea, pain, sleepiness)   Negative: Took another person's  prescription drug   Negative: [1] DOUBLE DOSE (an extra dose or lesser amount) of prescription drug AND [2] NO symptoms  (Exception: A double dose of antibiotics.)   Negative: Diabetes drug error or overdose (e.g., took wrong type of insulin or took extra dose)    Protocols used: Medication Question Call-A-  Nurse Triage SBAR    Is this a 2nd Level Triage? NO    Situation:  Pt was just a [facility].  Rx was not sent to pharmacy  cyclobenzaprine (FLEXERIL) 10 MG tablet 30 tablet 0 2/25/2024 -- No   Sig - Route: Take 1 tablet (10 mg) by mouth 3 times daily as needed for muscle spasms - Oral   Class: Local Print       Protocol Recommended Disposition:   Call PCP Now  Transferred pt over to the  to discuss.     Recommendation: Transferred pt over to the  to discuss.     Mehnaz Gibson RN on 2/25/2024 at 4:01 PM

## 2024-02-25 NOTE — PROGRESS NOTES
Assessment & Plan     Trigger point of right shoulder region  Procedure note   After consent the trigger points were identified and cleansed with alcohol . Total of 3 cc lidocaine 2% without epinephrine was injected and the needle was inserted to the trigger area and moved to break the point   This was repeated at each of the identified points   Total injections 3  Areas injected right mid and upper scapular border     Upper back strain, initial encounter  See also above notes. Ice for 20 minutes every 2 hours tonight and start ibuprofen 800 mg every 8 hours for the next couple days taken with food. Flexeril as needed to help relax muscles. Follow up if symptoms persist or worsen.    - cyclobenzaprine (FLEXERIL) 10 MG tablet  Dispense: 30 tablet; Refill: 0       No follow-ups on file.    NOHELIA Fernandez [facility]    Susan Coleman is a 34 year old male who presents to clinic today for the following health issues:  Chief Complaint   Patient presents with    Urgent Care     Shoulder pain right     HPI      Review of Systems  Constitutional, HEENT, cardiovascular, pulmonary, gi and gu systems are negative, except as otherwise noted.      Objective    /78 (BP Location: Right arm, Patient Position: Sitting, Cuff Size: Adult Regular)   Pulse 53   Temp 97.5  F (36.4  C) (Tympanic)   Wt 83 kg (183 lb)   SpO2 98%   BMI 24.82 kg/m    Physical Exam   GENERAL: alert and no distress  MS: generalized mild swelling/hypertrophy of the right trapezius with 3 trigger points along the right medial scapular border  SKIN: no suspicious lesions or rashes  NEURO: Normal strength and tone, mentation intact and speech normal

## 2024-02-26 NOTE — TELEPHONE ENCOUNTER
Called pt and informed him that the medications has been sent as requested, no other questions or concerns,    Linda Gorman MA on 2/25/2024 at 7:13 PM

## 2024-05-23 NOTE — PROGRESS NOTES
PREVENTIVE CARDIOLOGY INITIAL CONSULTATION    HPI:  Jared Cabrera is a 34 year old male who receives primary care from Dr. Rangel. He was referred to Cardiology clinic for risk assessment due to family history of heart disease.  He is accompanied in clinic by his wife.    Jared is a pleasant man with no history of clinical CVD.  However, he had a CAC scan done in December 2023 with a total score of 108.  Given his young age this puts him in the 99th percentile.  He has mildly elevated cholesterol, but a significant family history of premature ASCVD.  His father had a CABG at age 61 and his paternal grandfather had a CABG in his 40s.  In the case of his father, he reported some mild symptoms which resulted in a stress test which was abnormal followed by an angiogram that showed severe multivessel CAD.  Jared is very active and denies rest or exertional chest pain or dyspnea.  However, given his father's severe diffuse disease with minimal symptoms he is rightfully concerned about developing progressive asymptomatic disease    Overall Jared eats a pretty healthy diet.  His typical diet is outlined below.  Breakfast: light, Lar bar, fruit  Lunch: bagged salad, leftovers  Dinner: grilled vegetables; mix of chicken, fish, pork, and beef; limited fast food  Snacks: protein bars or fig bar, veggies and humus, fruit  Drinks: coffee, water, little alcohol  No smoking or tobacco     Jared runs 45 minutes daily.  He ran cross country and track in [employer] and [employer].     Sleep: feels rested, no concerns    Works for [employer], [employer], [employer]. Overall stress level is good.    2 Jared and his wife have boys: 5 and 7 year old    Blood pressure is well-controlled in clinic.  LDL is 131 mg/dL in December of treatment.    The ASCVD Risk score (Lyndsye MENDOZA, et al., 2019) failed to calculate for the following reasons:    The 2019 ASCVD risk score is only valid for ages 40 to 79     ASSESSMENT AND PLAN  Jared Cabrera is a 34 year old male with  evidence of subclinical CAD, mildly elevated cholesterol, and family history of premature ASCVD.    #. CAD: CAC score of 108 in December 2023, 99th percentile  #. HLD: Only mildly elevated LDL of 131 mg/dL  We had an in-depth discussion about atherosclerosis and how it progresses.  Given how active and healthy Jared is the amount of CAD on his CAC scan is unexpected and the only obvious risk factor is family history.  His cholesterol is not in the familial hypercholesterolemia range.  We will check a lipoprotein a to make sure that is not elevated.  Its most likely that he just has elevated polygenetic risk.  Because he has very few modifiable risk factors, we will focus on lipid-lowering therapy at this time.  We also talked about moving towards a more plant-based diet.  I think is reasonable to add a low-dose aspirin as well.    Recommendations:  -Start rosuvastatin 5 mg daily  -Lipid panel in 2 months  -Check lipoprotein a  -Start to incorporate more vegetarian meals    Will likely plan to follow-up in 2 to 3 years as long as Jared is asymptomatic and LDL is well-controlled.    Thank you for the opportunity to participate in the care of Mr. Jared Cabrera. Please feel free to contact me with any additional questions or concerns.    Danny Doe MD    Preventive Cardiology  Pager: [phone]    This note was dictated with voice recognition software and then edited. Please excuse any unintentional errors.    The total time of this encounter amounted to 62 minutes on the day of service. This time included time spent with the patient reviewing diet, physical activity and family history, reviewing records or previous testing, ordering tests, care coordination and performing post visit documentation.     PAST MEDICAL HISTORY:  There is no problem list on file for this patient.      CURRENT MEDICATIONS:  Current Outpatient Medications   Medication Sig Dispense Refill    cyclobenzaprine (FLEXERIL) 10  MG tablet Take 1 tablet (10 mg) by mouth 3 times daily as needed for muscle spasms 30 tablet 0       PAST SURGICAL HISTORY:  Past Surgical History:   Procedure Laterality Date    ORTHOPEDIC SURGERY      Femur surgery 2001 ankle surgery 2010       ALLERGIES  Patient has no known allergies.    FAMILY HX:  Family History   Problem Relation Age of Onset    Substance Abuse Mother         Alcoholism; in recovery 35 years    Coronary Artery Disease Father         CABG age 59 October 2023    Hypertension Father     Hyperlipidemia Father     Substance Abuse Father         Alcoholism; in recovery 35 years    Substance Abuse Sister         Alcoholism; In recovery 10 years       SOCIAL HX:  Social History     Tobacco Use    Smoking status: Never    Smokeless tobacco: Never   Substance Use Topics    Alcohol use: Yes    Drug use: Never        VITAL SIGNS:  There were no vitals taken for this visit.  There is no height or weight on file to calculate BMI.  Wt Readings from Last 2 Encounters:   02/25/24 83 kg (183 lb)   12/15/23 82.3 kg (181 lb 6.4 oz)       PHYSICAL EXAM  Gen: pleasant man sitting comfortably in NAD  Head: nc/at  Eyes: EOMI, PERRL  ENT: no OP lesions or erythema  Neck: supple, no lymphadenopathy  CV: nml s1/s2, no murmur or gallop; no JVD  Chest: clear lungs  Abd: soft, NT, NABS  Ext: warm, no LE edema  Skin: no rash on limited exam  Neuro: awake, alert, oriented, nml speech and gait  Vasc: 2+ bilateral carotid, brachial, radial, DP and PT pulses; no bruits noted    LABS: personally reviewed  CMP  Recent Labs   Lab Test 12/18/23  0721      POTASSIUM 4.2   CHLORIDE 103   CO2 29   ANIONGAP 7   *   BUN 14.1   CR 0.87   GFRESTIMATED >90   SOURAV 9.6   PROTTOTAL 7.1   ALBUMIN 4.7   BILITOTAL 0.4   ALKPHOS 61   AST 28   ALT 22     CBC  Recent Labs   Lab Test 12/18/23  0721   WBC 3.6*   RBC 5.18   HGB 14.2   HCT 41.6   MCV 80   MCH 27.4   MCHC 34.1   RDW 12.3        INRNo lab results found.  Recent Labs    Lab Test 12/18/23  0721   CHOL 197   HDL 54   *   TRIG 58     Recent Labs   Lab Test 12/18/23  0721   A1C 5.4       EKG: reviewed and discussed with the patient   5/24/2024 sinus bradycardia    Other Imaging: reviewed and discussed with the patient  12/22/23 CAC scan: score 108, 99th percentile

## 2024-05-23 NOTE — TELEPHONE ENCOUNTER
RECORDS RECEIVED FROM:    DATE RECEIVED:    NOTES STATUS DETAILS   OFFICE NOTE from referring provider  Internal 12/18/24 - Adrian Rangel MD [facility]    OFFICE NOTE from other cardiologists  Internal    RECORDS from hospital/ED N/A    MEDICATION LIST Internal    GENERAL CARDIO RECORDS   (ALL APPOINTMENT TYPES)     LABS (CBC,BMP,CMP, TSH) Internal    CT/CTA (IMAGES AND REPORTS) Internal 12/22/23

## 2024-05-24 ENCOUNTER — PRE VISIT (OUTPATIENT)
Dept: CARDIOLOGY | Facility: CLINIC | Age: 34
End: 2024-05-24
Payer: COMMERCIAL

## 2024-05-24 ENCOUNTER — OFFICE VISIT (OUTPATIENT)
Dept: CARDIOLOGY | Facility: CLINIC | Age: 34
End: 2024-05-24
Attending: INTERNAL MEDICINE
Payer: COMMERCIAL

## 2024-05-24 VITALS
HEART RATE: 47 BPM | WEIGHT: 181.2 LBS | DIASTOLIC BLOOD PRESSURE: 68 MMHG | BODY MASS INDEX: 24.58 KG/M2 | SYSTOLIC BLOOD PRESSURE: 115 MMHG | OXYGEN SATURATION: 100 %

## 2024-05-24 DIAGNOSIS — Z82.49 FAMILY HISTORY OF ISCHEMIC HEART DISEASE: ICD-10-CM

## 2024-05-24 DIAGNOSIS — I25.10 CORONARY ARTERY DISEASE DUE TO CALCIFIED CORONARY LESION: Primary | ICD-10-CM

## 2024-05-24 DIAGNOSIS — E78.5 HYPERLIPIDEMIA LDL GOAL <70: ICD-10-CM

## 2024-05-24 DIAGNOSIS — I25.84 CORONARY ARTERY DISEASE DUE TO CALCIFIED CORONARY LESION: Primary | ICD-10-CM

## 2024-05-24 PROCEDURE — 93010 ELECTROCARDIOGRAM REPORT: CPT | Performed by: INTERNAL MEDICINE

## 2024-05-24 PROCEDURE — 93005 ELECTROCARDIOGRAM TRACING: CPT

## 2024-05-24 PROCEDURE — 99205 OFFICE O/P NEW HI 60 MIN: CPT | Performed by: INTERNAL MEDICINE

## 2024-05-24 PROCEDURE — 99213 OFFICE O/P EST LOW 20 MIN: CPT | Performed by: INTERNAL MEDICINE

## 2024-05-24 RX ORDER — ROSUVASTATIN CALCIUM 5 MG/1
5 TABLET, COATED ORAL DAILY
Qty: 90 TABLET | Refills: 3 | Status: SHIPPED | OUTPATIENT
Start: 2024-05-24

## 2024-05-24 ASSESSMENT — PAIN SCALES - GENERAL: PAINLEVEL: NO PAIN (0)

## 2024-05-24 NOTE — NURSING NOTE
Chief Complaint   Patient presents with    New Patient     [place] prevention pt, self-referred d/t fam hx heart disease     Vitals were taken, medications reconciled, and EKG was performed.    Yecenia Bowden CNA  8:21 AM

## 2024-05-24 NOTE — PATIENT INSTRUCTIONS
"You were seen today in the Cardiovascular Clinic at the [facility].     Cardiology Providers you saw during your visit: Dr. Dony Doe     Diagnosis:   Encounter Diagnoses   Name Primary?    Hyperlipidemia LDL goal <70     Family history of ischemic heart disease     Coronary artery disease due to calcified coronary lesion Yes        Recommendations:   1. I recommend starting cholesterol lowering therapy with a statin. I would start with rosuvastatin 5mg daily. I usually recheck cholesterol panel 2 months after starting meds.  2. I think it would reasonable to start a low dose aspirin daily, 81mg.  3. I will put in an order for lipoprotein a.   4. Work on moving more towards a plant based diet.   5. Follow up with Dr. Dony Doe based on results.       Please feel free to call me with any questions or concerns.       Anali Kennedy RN     Questions: [phone].   First press #1 for the [facility] and then press #4 for \"Medical Questions\" to reach us Cardiology Nurses.     [phone].   First press #1 for the [facility] and then press #1     On Call Cardiologist for after hours or on weekends: [phone]   option #4 and ask to speak to the on-call Cardiologist.          If you need a medication refill please contact your pharmacy.  Please allow 3 business days for your refill to be completed.  ________________________________________________________________________________________________________________________________         " Discharge Instructions for  [address] [place] [address], [address] [place] [address]  Telephone: [phone]    NAME:  Quang Clock OF BIRTH:  1978  MEDICAL RECORD NUMBER:  [id number]  DATE:  7/22/2021      Return Appointment:  [x] Dressing supply provider:Jeanne  [] Home Healthcare:  [x] Return Appointment: 3 Week(s)  [] Nurse Visit: 1   Week(s)    [] Discharge from [facility]  [] Ordered tests:    [] Referrals:   [] Rx:     Wound Cleansing:   Do not scrub or use excessive force. Cleanse wound prior to applying a clean dressing with:  [x] Normal Saline   [x] Mild Soap & Water    [] Keep Wound Dry in Shower  [] Other:      Topical Treatments:  Do not apply lotions, creams, or ointments to wound bed unless directed. [x] Apply moisturizing lotion to skin surrounding the wound prior to dressing change.  [] Apply antifungal ointment to skin surrounding the wound prior to dressing change.  [] Apply thin film of moisture barrier ointment to skin immediately around wound. [] Other:        Dressings:           Wound Location right leg   [] Apply Primary Dressing:       [] MediHoney Gel    [] MediHoney Alginate               [] Calcium Alginate      [] Calcium Alginate with Silver   [] Collagen                   [] Collagen with Silver                [] Santyl with Moistened saline gauze              [] Hydrofera Blue (cut to size and moistened)  [] Hydrofera Blue Ready (Cut to size)   [] NS wet to dry    [] Betadine wet to dry              [] Hydrogel                [] Mepitel     [] Bactroban/Mupirocin               [x] Other: silvercel    [x] Cover and Secure with:     [x] Gauze [x] Tiffanie [] Kerlix   [] Foam [] Super Absorbant [] ABD     [] ACE Wrap            [] Other:    Avoid contact of tape with skin.     [x] Change dressing: [] Daily    [x] Every Other Day [] Once per week   [] Twice per week   [] Three times per week [] Do Not Change Dressing   [] Other:     Negative Pressure:           Wound Location:   [] Pressure @  mm/Hg  []Continuous []Intermittent   [] Black  [] White Foam              [] Bridge:               [] Change vac dressing three times per week    Pressure Relief:  [] When sitting, shift position or do seat lifts every 15 minutes.  [] Wheelchair cushion [] Specialty Bed/Mattress  [] Turn every 2 hours when in bed. Avoid direct pressure on wound site. Limit side lying to 30 degree tilt. Limit HOB elevation to 30 degrees. Edema Control:  Apply: [x] Compression Stocking [x]Right Leg [x]Left Leg   [] Tubigrip []Right Leg Double Layer []Left Leg Double Layer      []Right Leg Single Layer []Left Leg Single Layer     [x] Elevate leg(s) above the level of the heart when sitting. [x] Avoid prolonged standing in one place. Compression:  Apply: [] Multilayer Compression Wrap: [x]RightLeg []Left Leg                                 []Profore  []Profore Lite             []Unna     [x] Do not get leg(s) with wrap wet. If wraps become too tight call the center or completely remove the wrap. [x] Elevate leg(s) above the level of the heart when sitting. [x] Avoid prolonged standing in one place. Off-Loading:   [] Off-loading when [] walking  [] in bed [] sitting  [] Total non-weight bearing  [] Right Leg  [] Left Leg   [] Assistive Device [] Walker [] Cane      [] Wheelchair  [] Crutches   [] Surgical shoe    [] Podus Boot(s)   [] Foam Boot(s)  [] Roll About    [] Cast Boot [] CROW Boot  [] Wedge Shoe  [] Other:    Contact Cast:  Apply: [] Total Contact Cast Applied in Clinic []RightLeg []Left Leg   [] Do not get cast wet. Contact center or go to emergency room if there is a foul odor or becomes uncomfortable due to feeling tight or swelling. Do not use objects inside of cast to scratch.       Dietary:  [x] Diet as tolerated: [] Calorie Diabetic Diet: [] No Added Salt:  [] Increase Protein: [] Other:     Activity:  [x] Activity as tolerated:    [] Patient has no activity restrictions       [] Strict Bedrest:   [] Remain off Work:       [] May return to full duty work:                                     [] Return to work with restrictions:                    [address] Information: Should you experience any significant changes in your wound(s) or have questions about your wound care, please contact Gala Cancino at [address] 8:00 am - 4:30. If you need help with your wound outside of these hours and cannot wait until we are again available, contact your PCP or go to the hospital emergency room. PLEASE NOTE: IF YOU ARE UNABLE TO OBTAIN WOUND SUPPLIES, CONTINUE TO USE THE SUPPLIES YOU HAVE AVAILABLE UNTIL YOU ARE ABLE TO REACH US. IT IS MOST IMPORTANT TO KEEP THE WOUND COVERED AT ALL TIMES.     [] Dr. Ed Hansen   [] Dr. Husam Roy  [x] Dr. Dhara Mccarty  [] Dr. Fabian Fabry

## 2024-05-24 NOTE — LETTER
5/24/2024      RE: Jared Cabrera  [address]  [facility] [address]       Dear Colleague,    Thank you for the opportunity to participate in the care of your patient, Jared Cabrera, at the [facility] [place] [facility]. Please see a copy of my visit note below.    PREVENTIVE CARDIOLOGY INITIAL CONSULTATION    HPI:  Jared Cabrera is a 34 year old male who receives primary care from Dr. Rangel. He was referred to Cardiology clinic for risk assessment due to family history of heart disease.  He is accompanied in clinic by his wife.    Jared is a pleasant man with no history of clinical CVD.  However, he had a CAC scan done in December 2023 with a total score of 108.  Given his young age this puts him in the 99th percentile.  He has mildly elevated cholesterol, but a significant family history of premature ASCVD.  His father had a CABG at age 61 and his paternal grandfather had a CABG in his 40s.  In the case of his father, he reported some mild symptoms which resulted in a stress test which was abnormal followed by an angiogram that showed severe multivessel CAD.  Jared is very active and denies rest or exertional chest pain or dyspnea.  However, given his father's severe diffuse disease with minimal symptoms he is rightfully concerned about developing progressive asymptomatic disease    Overall Jared eats a pretty healthy diet.  His typical diet is outlined below.  Breakfast: light, Lar bar, fruit  Lunch: bagged salad, leftovers  Dinner: grilled vegetables; mix of chicken, fish, pork, and beef; limited fast food  Snacks: protein bars or fig bar, veggies and humus, fruit  Drinks: coffee, water, little alcohol  No smoking or tobacco     Jared runs 45 minutes daily.  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     Sleep: feels rested, no concerns    Works for [employer], [employer], [employer]. Overall stress level is good.    2 Jared and his wife  have boys: 5 and 7 year old    Blood pressure is well-controlled in clinic.  LDL is 131 mg/dL in December of treatment.    The ASCVD Risk score (Lyndsey DK, et al., 2019) failed to calculate for the following reasons:    The 2019 ASCVD risk score is only valid for ages 40 to 79     ASSESSMENT AND PLAN  Jared Cabrera is a 34 year old male with evidence of subclinical CAD, mildly elevated cholesterol, and family history of premature ASCVD.    #. CAD: CAC score of 108 in December 2023, 99th percentile  #. HLD: Only mildly elevated LDL of 131 mg/dL  We had an in-depth discussion about atherosclerosis and how it progresses.  Given how active and healthy Jared is the amount of CAD on his CAC scan is unexpected and the only obvious risk factor is family history.  His cholesterol is not in the familial hypercholesterolemia range.  We will check a lipoprotein a to make sure that is not elevated.  Its most likely that he just has elevated polygenetic risk.  Because he has very few modifiable risk factors, we will focus on lipid-lowering therapy at this time.  We also talked about moving towards a more plant-based diet.  I think is reasonable to add a low-dose aspirin as well.    Recommendations:  -Start rosuvastatin 5 mg daily  -Lipid panel in 2 months  -Check lipoprotein a  -Start to incorporate more vegetarian meals    Will likely plan to follow-up in 2 to 3 years as long as Jared is asymptomatic and LDL is well-controlled.    Thank you for the opportunity to participate in the care of Mr. Jared Cabrera. Please feel free to contact me with any additional questions or concerns.    Danny Doe MD    Preventive Cardiology  Pager: [phone]    This note was dictated with voice recognition software and then edited. Please excuse any unintentional errors.    The total time of this encounter amounted to 62 minutes on the day of service. This time included time spent with the patient reviewing diet, physical  activity and family history, reviewing records or previous testing, ordering tests, care coordination and performing post visit documentation.     PAST MEDICAL HISTORY:  There is no problem list on file for this patient.      CURRENT MEDICATIONS:  Current Outpatient Medications   Medication Sig Dispense Refill     cyclobenzaprine (FLEXERIL) 10 MG tablet Take 1 tablet (10 mg) by mouth 3 times daily as needed for muscle spasms 30 tablet 0       PAST SURGICAL HISTORY:  Past Surgical History:   Procedure Laterality Date     ORTHOPEDIC SURGERY      Femur surgery 2001 ankle surgery 2010       ALLERGIES  Patient has no known allergies.    FAMILY HX:  Family History   Problem Relation Age of Onset     Substance Abuse Mother         Alcoholism; in recovery 35 years     Coronary Artery Disease Father         CABG age 59 October 2023     Hypertension Father      Hyperlipidemia Father      Substance Abuse Father         Alcoholism; in recovery 35 years     Substance Abuse Sister         Alcoholism; In recovery 10 years       SOCIAL HX:  Social History     Tobacco Use     Smoking status: Never     Smokeless tobacco: Never   Substance Use Topics     Alcohol use: Yes     Drug use: Never        VITAL SIGNS:  There were no vitals taken for this visit.  There is no height or weight on file to calculate BMI.  Wt Readings from Last 2 Encounters:   02/25/24 83 kg (183 lb)   12/15/23 82.3 kg (181 lb 6.4 oz)       PHYSICAL EXAM  Gen: pleasant man sitting comfortably in NAD  Head: nc/at  Eyes: EOMI, PERRL  ENT: no OP lesions or erythema  Neck: supple, no lymphadenopathy  CV: nml s1/s2, no murmur or gallop; no JVD  Chest: clear lungs  Abd: soft, NT, NABS  Ext: warm, no LE edema  Skin: no rash on limited exam  Neuro: awake, alert, oriented, nml speech and gait  Vasc: 2+ bilateral carotid, brachial, radial, DP and PT pulses; no bruits noted    LABS: personally reviewed  CMP  Recent Labs   Lab Test 12/18/23  0721      POTASSIUM 4.2    CHLORIDE 103   CO2 29   ANIONGAP 7   *   BUN 14.1   CR 0.87   GFRESTIMATED >90   SOURAV 9.6   PROTTOTAL 7.1   ALBUMIN 4.7   BILITOTAL 0.4   ALKPHOS 61   AST 28   ALT 22     CBC  Recent Labs   Lab Test 12/18/23  0721   WBC 3.6*   RBC 5.18   HGB 14.2   HCT 41.6   MCV 80   MCH 27.4   MCHC 34.1   RDW 12.3        INRNo lab results found.  Recent Labs   Lab Test 12/18/23  0721   CHOL 197   HDL 54   *   TRIG 58     Recent Labs   Lab Test 12/18/23  0721   A1C 5.4       EKG: reviewed and discussed with the patient   5/24/2024 sinus bradycardia    Other Imaging: reviewed and discussed with the patient  12/22/23 CAC scan: score 108, 99th percentile      Please do not hesitate to contact me if you have any questions/concerns.     Sincerely,     Danny Doe MD

## 2024-05-30 LAB
ATRIAL RATE - MUSE: 43 BPM
DIASTOLIC BLOOD PRESSURE - MUSE: NORMAL MMHG
INTERPRETATION ECG - MUSE: NORMAL
P AXIS - MUSE: 63 DEGREES
PR INTERVAL - MUSE: 132 MS
QRS DURATION - MUSE: 94 MS
QT - MUSE: 432 MS
QTC - MUSE: 365 MS
R AXIS - MUSE: 33 DEGREES
SYSTOLIC BLOOD PRESSURE - MUSE: NORMAL MMHG
T AXIS - MUSE: 19 DEGREES
VENTRICULAR RATE- MUSE: 43 BPM

## 2024-05-31 ENCOUNTER — LAB (OUTPATIENT)
Dept: LAB | Facility: CLINIC | Age: 34
End: 2024-05-31
Payer: COMMERCIAL

## 2024-05-31 DIAGNOSIS — I25.84 CORONARY ARTERY DISEASE DUE TO CALCIFIED CORONARY LESION: ICD-10-CM

## 2024-05-31 DIAGNOSIS — Z82.49 FAMILY HISTORY OF ISCHEMIC HEART DISEASE: ICD-10-CM

## 2024-05-31 DIAGNOSIS — I25.10 CORONARY ARTERY DISEASE DUE TO CALCIFIED CORONARY LESION: ICD-10-CM

## 2024-05-31 PROCEDURE — 83695 ASSAY OF LIPOPROTEIN(A): CPT

## 2024-05-31 PROCEDURE — 36415 COLL VENOUS BLD VENIPUNCTURE: CPT

## 2024-06-01 LAB — APO A-I SERPL-MCNC: >240 MG/DL

## 2024-09-03 ENCOUNTER — TELEPHONE (OUTPATIENT)
Dept: CARDIOLOGY | Facility: CLINIC | Age: 34
End: 2024-09-03
Payer: COMMERCIAL

## 2024-09-03 NOTE — TELEPHONE ENCOUNTER
9/3 spoke to pts wife and stated that they're in [place] now and said that she works at the [employer] and will schedule it for him when they get back from their trip.

## 2024-09-19 ENCOUNTER — LAB (OUTPATIENT)
Dept: LAB | Facility: CLINIC | Age: 34
End: 2024-09-19
Payer: COMMERCIAL

## 2024-09-19 DIAGNOSIS — I25.84 CORONARY ARTERY DISEASE DUE TO CALCIFIED CORONARY LESION: ICD-10-CM

## 2024-09-19 DIAGNOSIS — E78.5 HYPERLIPIDEMIA LDL GOAL <70: ICD-10-CM

## 2024-09-19 DIAGNOSIS — I25.10 CORONARY ARTERY DISEASE DUE TO CALCIFIED CORONARY LESION: ICD-10-CM

## 2024-09-19 DIAGNOSIS — I25.10 CORONARY ARTERY DISEASE INVOLVING NATIVE CORONARY ARTERY OF NATIVE HEART WITHOUT ANGINA PECTORIS: Primary | ICD-10-CM

## 2024-09-19 DIAGNOSIS — Z82.49 FAMILY HISTORY OF ISCHEMIC HEART DISEASE: ICD-10-CM

## 2024-09-19 LAB
CHOLEST SERPL-MCNC: 148 MG/DL
FASTING STATUS PATIENT QL REPORTED: YES
HDLC SERPL-MCNC: 47 MG/DL
LDLC SERPL CALC-MCNC: 87 MG/DL
NONHDLC SERPL-MCNC: 101 MG/DL
TRIGL SERPL-MCNC: 69 MG/DL

## 2024-09-19 PROCEDURE — 36415 COLL VENOUS BLD VENIPUNCTURE: CPT

## 2024-09-19 PROCEDURE — 80061 LIPID PANEL: CPT

## 2024-10-25 ENCOUNTER — OFFICE VISIT (OUTPATIENT)
Dept: URGENT CARE | Facility: URGENT CARE | Age: 34
End: 2024-10-25
Payer: COMMERCIAL

## 2024-10-25 ENCOUNTER — ANCILLARY PROCEDURE (OUTPATIENT)
Dept: GENERAL RADIOLOGY | Facility: CLINIC | Age: 34
End: 2024-10-25
Attending: PHYSICIAN ASSISTANT
Payer: COMMERCIAL

## 2024-10-25 VITALS
BODY MASS INDEX: 24.28 KG/M2 | DIASTOLIC BLOOD PRESSURE: 75 MMHG | RESPIRATION RATE: 16 BRPM | WEIGHT: 179 LBS | TEMPERATURE: 98.2 F | HEART RATE: 57 BPM | OXYGEN SATURATION: 99 % | SYSTOLIC BLOOD PRESSURE: 127 MMHG

## 2024-10-25 DIAGNOSIS — R50.9 FEVER, UNSPECIFIED FEVER CAUSE: ICD-10-CM

## 2024-10-25 DIAGNOSIS — J18.9 PNEUMONIA OF LEFT LUNG DUE TO INFECTIOUS ORGANISM, UNSPECIFIED PART OF LUNG: Primary | ICD-10-CM

## 2024-10-25 LAB
DEPRECATED S PYO AG THROAT QL EIA: NEGATIVE
FLUAV AG SPEC QL IA: NEGATIVE
FLUBV AG SPEC QL IA: NEGATIVE
GROUP A STREP BY PCR: NOT DETECTED

## 2024-10-25 PROCEDURE — 87651 STREP A DNA AMP PROBE: CPT | Performed by: PHYSICIAN ASSISTANT

## 2024-10-25 PROCEDURE — 87804 INFLUENZA ASSAY W/OPTIC: CPT | Performed by: PHYSICIAN ASSISTANT

## 2024-10-25 PROCEDURE — 99214 OFFICE O/P EST MOD 30 MIN: CPT | Performed by: PHYSICIAN ASSISTANT

## 2024-10-25 PROCEDURE — 71046 X-RAY EXAM CHEST 2 VIEWS: CPT | Mod: TC | Performed by: STUDENT IN AN ORGANIZED HEALTH CARE EDUCATION/TRAINING PROGRAM

## 2024-10-25 RX ORDER — AZITHROMYCIN 250 MG/1
TABLET, FILM COATED ORAL
Qty: 6 TABLET | Refills: 0 | Status: SHIPPED | OUTPATIENT
Start: 2024-10-25 | End: 2024-10-30

## 2024-10-25 RX ORDER — AZITHROMYCIN 250 MG/1
TABLET, FILM COATED ORAL
Qty: 6 TABLET | Refills: 0 | Status: SHIPPED | OUTPATIENT
Start: 2024-10-25 | End: 2024-10-25

## 2024-10-25 RX ORDER — BENZONATATE 200 MG/1
200 CAPSULE ORAL 3 TIMES DAILY PRN
Qty: 40 CAPSULE | Refills: 0 | Status: SHIPPED | OUTPATIENT
Start: 2024-10-25

## 2024-10-25 RX ORDER — BENZONATATE 200 MG/1
200 CAPSULE ORAL 3 TIMES DAILY PRN
Qty: 40 CAPSULE | Refills: 0 | Status: SHIPPED | OUTPATIENT
Start: 2024-10-25 | End: 2024-10-25

## 2024-10-25 ASSESSMENT — ENCOUNTER SYMPTOMS
SHORTNESS OF BREATH: 0
ABDOMINAL PAIN: 0
SORE THROAT: 0
DIARRHEA: 0
FEVER: 1
HEADACHES: 0
NEUROLOGICAL NEGATIVE: 1
ALLERGIC/IMMUNOLOGIC NEGATIVE: 1
FREQUENCY: 0
CHILLS: 1
MYALGIAS: 1
VOMITING: 0
WHEEZING: 0
NAUSEA: 0
HEMATURIA: 0
PALPITATIONS: 0
CARDIOVASCULAR NEGATIVE: 1
GASTROINTESTINAL NEGATIVE: 1
CHEST TIGHTNESS: 0
DYSURIA: 0
COUGH: 1

## 2024-10-25 NOTE — PROGRESS NOTES
Chief Complaint:     Chief Complaint   Patient presents with    Urgent Care    URI     Per patient states he has been having following symptoms fever, chills, cough-productive-green mucus, headaches, post nasal drip and throat pain but states it's due to the cough, patient has been taking ibuprofen for symptoms has done 3 covid test all negative        Results for orders placed or performed in visit on 10/25/24   Streptococcus A Rapid Screen w/Reflex to PCR - Clinic Collect     Status: Normal    Specimen: Throat; Swab   Result Value Ref Range    Group A Strep antigen Negative Negative   Influenza A & B Antigen - Clinic Collect     Status: Normal    Specimen: Nose; Swab   Result Value Ref Range    Influenza A antigen Negative Negative    Influenza B antigen Negative Negative    Narrative    Test results must be correlated with clinical data. If necessary, results should be confirmed by a molecular assay or viral culture.       Medical Decision Making:    Vital signs reviewed by Uli Wheatley PA-C  /75   Pulse 57   Temp 98.2  F (36.8  C) (Tympanic)   Resp 16   Wt 81.2 kg (179 lb)   SpO2 99%   BMI 24.28 kg/m      Differential Diagnosis:  URI Adult/Peds:  Bronchitis-viral, Pneumonia, and Viral upper respiratory illness        ASSESSMENT    1. Pneumonia of left lung due to infectious organism, unspecified part of lung    2. Fever, unspecified fever cause        PLAN    Patient is in no acute distress.    Temp is 98.2 in clinic today, lung sounds were clear, and O2 sats at 99% on RA.    CXR shows infiltrate in L lung per my read.  Rx for Augmentin and Zithromax sent in.  RST was negative.  We will call with PCR results only if positive.  Influenza was negative.    Rest, Push fluids, vaporizer, elevation of head of bed.  Ibuprofen and or Tylenol for any fever or body aches.  Rx for Tessalon cough suppressant- PRN- as discussed.   If symptoms worsen, recheck immediately otherwise follow up with your PCP in 1  week if symptoms are not improving.  Worrisome symptoms discussed with instructions to go to the ED.  Patient verbalized understanding and agreed with this plan.    Labs:    Results for orders placed or performed in visit on 10/25/24   Streptococcus A Rapid Screen w/Reflex to PCR - Clinic Collect     Status: Normal    Specimen: Throat; Swab   Result Value Ref Range    Group A Strep antigen Negative Negative   Influenza A & B Antigen - Clinic Collect     Status: Normal    Specimen: Nose; Swab   Result Value Ref Range    Influenza A antigen Negative Negative    Influenza B antigen Negative Negative    Narrative    Test results must be correlated with clinical data. If necessary, results should be confirmed by a molecular assay or viral culture.        Vital signs reviewed by Uli Wheatley PA-C  /75   Pulse 57   Temp 98.2  F (36.8  C) (Tympanic)   Resp 16   Wt 81.2 kg (179 lb)   SpO2 99%   BMI 24.28 kg/m      Current Meds      Current Outpatient Medications:     amoxicillin-clavulanate (AUGMENTIN) 875-125 MG tablet, Take 1 tablet by mouth 2 times daily for 10 days., Disp: 20 tablet, Rfl: 0    azithromycin (ZITHROMAX) 250 MG tablet, Take 2 tablets (500 mg) by mouth daily for 1 day, THEN 1 tablet (250 mg) daily for 4 days., Disp: 6 tablet, Rfl: 0    benzonatate (TESSALON) 200 MG capsule, Take 1 capsule (200 mg) by mouth 3 times daily as needed for cough., Disp: 40 capsule, Rfl: 0    rosuvastatin (CRESTOR) 5 MG tablet, Take 1 tablet (5 mg) by mouth daily, Disp: 90 tablet, Rfl: 3    Current Facility-Administered Medications:     candida albicans skin test injection 0.1 mL, 0.1 mL, Intradermal, Once, Abdoulaye Gary MD      Respiratory History    occasional episodes of bronchitis      SUBJECTIVE    HPI: Jared Cabrera is an 34 year old male who presents with aching, chest congestion, cough nonproductive, occasional, and fever.  Symptoms began 2  days ago and has gradually worsening.  There is no shortness  of breath, wheezing, and chest pain.  Patient is eating and drinking well.  No nausea, vomiting, or diarrhea.    Patient denies any recent travel or exposure to known COVID positive tested individual.      ROS:     Review of Systems   Constitutional:  Positive for chills and fever.   HENT:  Positive for congestion. Negative for sore throat.    Respiratory:  Positive for cough. Negative for chest tightness, shortness of breath and wheezing.    Cardiovascular: Negative.  Negative for chest pain and palpitations.   Gastrointestinal: Negative.  Negative for abdominal pain, diarrhea, nausea and vomiting.   Genitourinary:  Negative for dysuria, frequency, hematuria and urgency.   Musculoskeletal:  Positive for myalgias.   Skin:  Negative for rash.   Allergic/Immunologic: Negative.  Negative for immunocompromised state.   Neurological: Negative.  Negative for headaches.         Family History   Family History   Problem Relation Age of Onset    Substance Abuse Mother         Alcoholism; in recovery 35 years    Coronary Artery Disease Father         CABG age 61 October 2023    Hypertension Father     Hyperlipidemia Father     Substance Abuse Father         Alcoholism; in recovery 35 years    Substance Abuse Sister         Alcoholism; In recovery 10 years    CABG Paternal Grandfather         age 40s        Problem history  There is no problem list on file for this patient.       Allergies  No Known Allergies     Social History  Social History     Socioeconomic History    Marital status:      Spouse name: Not on file    Number of children: Not on file    Years of education: Not on file    Highest education level: Not on file   Occupational History    Not on file   Tobacco Use    Smoking status: Never    Smokeless tobacco: Never   Vaping Use    Vaping status: Never Used   Substance and Sexual Activity    Alcohol use: Yes    Drug use: Never    Sexual activity: Yes     Partners: Female     Birth control/protection: I.U.D.    Other Topics Concern    Parent/sibling w/ CABG, MI or angioplasty before 65F 55M? No   Social History Narrative    Not on file     Social Drivers of Health     Financial Resource Strain: Low Risk  (12/12/2023)    Financial Resource Strain     Within the past 12 months, have you or your family members you live with been unable to get utilities (heat, electricity) when it was really needed?: No   Food Insecurity: Low Risk  (12/12/2023)    Food Insecurity     Within the past 12 months, did you worry that your food would run out before you got money to buy more?: No     Within the past 12 months, did the food you bought just not last and you didn t have money to get more?: No   Transportation Needs: Low Risk  (12/12/2023)    Transportation Needs     Within the past 12 months, has lack of transportation kept you from medical appointments, getting your medicines, non-medical meetings or appointments, work, or from getting things that you need?: No   Physical Activity: Not on file   Stress: Not on file   Social Connections: Not on file   Interpersonal Safety: Low Risk  (12/15/2023)    Interpersonal Safety     Do you feel physically and emotionally safe where you currently live?: Yes     Within the past 12 months, have you been hit, slapped, kicked or otherwise physically hurt by someone?: No     Within the past 12 months, have you been humiliated or emotionally abused in other ways by your partner or ex-partner?: No   Housing Stability: Low Risk  (12/12/2023)    Housing Stability     Do you have housing? : Yes     Are you worried about losing your housing?: No        OBJECTIVE     Vital signs reviewed by Uli Wheatley PA-C  /75   Pulse 57   Temp 98.2  F (36.8  C) (Tympanic)   Resp 16   Wt 81.2 kg (179 lb)   SpO2 99%   BMI 24.28 kg/m       Physical Exam  Vitals and nursing note reviewed.   Constitutional:       General: He is not in acute distress.     Appearance: He is well-developed. He is not  ill-appearing, toxic-appearing or diaphoretic.   HENT:      Head: Normocephalic and atraumatic.      Right Ear: Hearing, tympanic membrane, ear canal and external ear normal. Tympanic membrane is not perforated, erythematous, retracted or bulging.      Left Ear: Hearing, tympanic membrane, ear canal and external ear normal. Tympanic membrane is not perforated, erythematous, retracted or bulging.      Nose: Nose normal. No mucosal edema, congestion or rhinorrhea.      Mouth/Throat:      Pharynx: No oropharyngeal exudate or posterior oropharyngeal erythema.      Tonsils: No tonsillar exudate or tonsillar abscesses. 0 on the right. 0 on the left.   Eyes:      Pupils: Pupils are equal, round, and reactive to light.   Cardiovascular:      Rate and Rhythm: Normal rate and regular rhythm.      Heart sounds: Normal heart sounds, S1 normal and S2 normal. Heart sounds not distant. No murmur heard.     No friction rub. No gallop.   Pulmonary:      Effort: Pulmonary effort is normal. No respiratory distress.      Breath sounds: Normal breath sounds. No decreased breath sounds, wheezing, rhonchi or rales.   Abdominal:      General: Bowel sounds are normal. There is no distension.      Palpations: Abdomen is soft.      Tenderness: There is no abdominal tenderness.   Musculoskeletal:      Cervical back: Normal range of motion and neck supple.   Lymphadenopathy:      Cervical: No cervical adenopathy.   Skin:     General: Skin is warm and dry.      Findings: No rash.   Neurological:      Mental Status: He is alert.      Cranial Nerves: No cranial nerve deficit.   Psychiatric:         Attention and Perception: He is attentive.         Speech: Speech normal.         Behavior: Behavior normal. Behavior is cooperative.         Thought Content: Thought content normal.         Judgment: Judgment normal.           Uli Wheatley PA-C  10/25/2024, 12:56 PM

## 2024-11-04 ENCOUNTER — VIRTUAL VISIT (OUTPATIENT)
Dept: FAMILY MEDICINE | Facility: CLINIC | Age: 34
End: 2024-11-04
Payer: COMMERCIAL

## 2024-11-04 DIAGNOSIS — J18.9 COMMUNITY ACQUIRED PNEUMONIA OF LEFT LUNG, UNSPECIFIED PART OF LUNG: Primary | ICD-10-CM

## 2024-11-04 DIAGNOSIS — J40 BRONCHITIS: ICD-10-CM

## 2024-11-04 PROCEDURE — 99213 OFFICE O/P EST LOW 20 MIN: CPT | Mod: 95 | Performed by: INTERNAL MEDICINE

## 2024-11-04 NOTE — PROGRESS NOTES
"  If patient has telephone visit, have they been educated on video visit as preferred visit method and offered to change to video visit? N/A        Instructions Relayed to Patient by Virtual Roomer:     Patient is active on [employer]:   Relayed following to patient: \"It looks like you are active on [employer], are you able to join the visit this way? If not, do you need us to send you a link now or would you like your provider to send a link via text or email when they are ready to initiate the visit?\"      Patient Confirmed they will join visit via: Email   Reminded patient to ensure they were logged on to virtual visit by arrival time listed.   Asked if patient has flexibility to initiate visit sooner than arrival time: patient stated yes, documented in appointment notes availability to initiate visit earlier than arrival time     If pediatric virtual visit, ensured pediatric patient along with parent/guardian will be present for video visit.     Patient offered the website [email or web address] and/or phone number to [employer] Help line: [phone]    Jared is a 34 year old who is being evaluated via a billable video visit.    How would you like to obtain your AVS? [employer]  If the video visit is dropped, the invitation should be resent by: Text to cell phone: [phone]  Will anyone else be joining your video visit? No      Assessment & Plan     Community acquired pneumonia of left lung, unspecified part of lung  Recently was diagnosed with pneumonia  Chest x-ray reviewed by me  He clearly shows that he has got consolidation on the left side  He is feeling much better  His energy levels have improved and he is back to normal almost 90%  He has started exercising again  He is appetite is back to normal  Does not have any more night sweats or fevers  I think we need to clearly document resolution of pneumonia  For this reason he needs a repeat chest x-ray  Since he is clinically improved I do not think " we need to do any lab work  He has finished his course of antibiotics  - XR Chest 2 Views; Future    Bronchitis  He has been having a lot of coughing  Coughing is happening mainly when he is exercising or talking  No cough at night  I suspect he has got bronchial hypersensitivity that is being caused by the recent pneumonia  This should gradually get better  We discussed about options like codeine cough syrup/steroids/inhalers however since his  cough is not bad at night and he is able to sleep I think we can refrain from using any of these and he will get better gradually  He can try OTC remedies for cough                Susan Coleman is a 34 year old, presenting for the following health issues:  Chronic Cough (Getting worse after finishing rx for  )    History of Present Illness       Reason for visit:  Pneumonia follow up.  Coughing has increased over the past few days.    He eats 2-3 servings of fruits and vegetables daily.He consumes 0 sweetened beverage(s) daily.He exercises with enough effort to increase his heart rate 30 to 60 minutes per day.  He exercises with enough effort to increase his heart rate 7 days per week.   He is taking medications regularly.       Acute Illness  Acute illness concerns: Cough   Onset/Duration: 10/25  Symptoms:  Fever: No  Chills/Sweats: YES  Headache (location?): No  Sinus Pressure: No  Conjunctivitis:  No  Ear Pain: no  Rhinorrhea: No  Congestion: YES  Sore Throat: No  Cough: YES-productive of clear sputum  Wheeze: YES  Decreased Appetite: No  Nausea: No  Vomiting: No  Diarrhea: No  Dysuria/Freq.: No  Dysuria or Hematuria: No  Fatigue/Achiness: No  Sick/Strep Exposure: No  Therapies tried and outcome: antibiotics         Review of Systems  Constitutional, HEENT, cardiovascular, pulmonary, gi and gu systems are negative, except as otherwise noted.      Objective           Vitals:  No vitals were obtained today due to virtual visit.    Physical Exam   GENERAL: alert and no  distress  EYES: Eyes grossly normal to inspection.  No discharge or erythema, or obvious scleral/conjunctival abnormalities.  RESP: No audible wheeze, cough, or visible cyanosis.    SKIN: Visible skin clear. No significant rash, abnormal pigmentation or lesions.  NEURO: Cranial nerves grossly intact.  Mentation and speech appropriate for age.  PSYCH: Appropriate affect, tone, and pace of words          Video-Visit Details    Type of service:  Video Visit   Originating Location (pt. Location): Home    Distant Location (provider location):  Off-site  Platform used for Video Visit: [facility]  Signed Electronically by: Adrian Rangel MD

## 2024-11-15 ENCOUNTER — PATIENT OUTREACH (OUTPATIENT)
Dept: CARE COORDINATION | Facility: CLINIC | Age: 34
End: 2024-11-15
Payer: COMMERCIAL

## 2024-11-22 ENCOUNTER — ANCILLARY PROCEDURE (OUTPATIENT)
Dept: GENERAL RADIOLOGY | Facility: CLINIC | Age: 34
End: 2024-11-22
Attending: INTERNAL MEDICINE
Payer: COMMERCIAL

## 2024-11-22 DIAGNOSIS — J18.9 COMMUNITY ACQUIRED PNEUMONIA OF LEFT LUNG, UNSPECIFIED PART OF LUNG: ICD-10-CM

## 2024-11-22 PROCEDURE — 71046 X-RAY EXAM CHEST 2 VIEWS: CPT | Mod: TC | Performed by: RADIOLOGY

## 2025-01-09 ENCOUNTER — PATIENT OUTREACH (OUTPATIENT)
Dept: CARE COORDINATION | Facility: CLINIC | Age: 35
End: 2025-01-09
Payer: COMMERCIAL

## 2025-02-15 ENCOUNTER — HEALTH MAINTENANCE LETTER (OUTPATIENT)
Age: 35
End: 2025-02-15

## 2025-05-21 ENCOUNTER — VIRTUAL VISIT (OUTPATIENT)
Dept: FAMILY MEDICINE | Facility: CLINIC | Age: 35
End: 2025-05-21
Payer: COMMERCIAL

## 2025-05-21 DIAGNOSIS — B37.2 CANDIDAL INTERTRIGO: Primary | ICD-10-CM

## 2025-05-21 PROCEDURE — 98005 SYNCH AUDIO-VIDEO EST LOW 20: CPT | Performed by: INTERNAL MEDICINE

## 2025-05-21 RX ORDER — ECONAZOLE NITRATE 10 MG/G
CREAM TOPICAL DAILY
Qty: 170 G | Refills: 3 | Status: SHIPPED | OUTPATIENT
Start: 2025-05-21

## 2025-05-21 RX ORDER — NYSTATIN 100000 [USP'U]/G
POWDER TOPICAL 2 TIMES DAILY
Qty: 180 G | Refills: 3 | Status: SHIPPED | OUTPATIENT
Start: 2025-05-21

## 2025-06-02 ENCOUNTER — MYC MEDICAL ADVICE (OUTPATIENT)
Dept: FAMILY MEDICINE | Facility: CLINIC | Age: 35
End: 2025-06-02
Payer: COMMERCIAL

## 2025-06-02 DIAGNOSIS — B37.2 CANDIDAL INTERTRIGO: Primary | ICD-10-CM

## 2025-06-02 RX ORDER — HYDROCORTISONE 25 MG/G
CREAM TOPICAL 2 TIMES DAILY
Qty: 90 G | Refills: 0 | Status: SHIPPED | OUTPATIENT
Start: 2025-06-02

## 2025-06-02 NOTE — TELEPHONE ENCOUNTER
Dr. Rangel, please see mychart message from patient and advise. Thank you.     Background:  Patient had virtual visit 5/21/25 with Dr. Rangel:  Assessment & Plan  Candidal intertrigo  Complaining of itching in the groin  He also has some redness but the itching is the main problem  He runs a lot  He also is currently training for the [employer]  As a part of the training he has to wear heavy/tight clothing's  This causes him to have a lot of sweating  He has tried some over-the-counter remedies like Lotrimin cream without much benefit  Today discussed about keeping the area dry as much as possible  We will try both the nystatin powder and econazole cream for a couple of weeks and after that he can stop using them if the issue resolves and use them as needed  However if the problem does not get better or the redness get worse we need to look at antifungal with steroid  - nystatin (MYCOSTATIN) 172504 UNIT/GM external powder; Apply topically 2 times daily.  - econazole nitrate 1 % external cream; Apply topically daily.      Linda Weller, RN, BSN, PHN  [facility]

## 2025-06-23 ENCOUNTER — OFFICE VISIT (OUTPATIENT)
Dept: DERMATOLOGY | Facility: CLINIC | Age: 35
End: 2025-06-23
Payer: COMMERCIAL

## 2025-06-23 DIAGNOSIS — B07.0 PLANTAR WART: ICD-10-CM

## 2025-06-23 DIAGNOSIS — K13.70 ORAL LESION: Primary | ICD-10-CM

## 2025-06-23 DIAGNOSIS — L73.9 FOLLICULITIS: ICD-10-CM

## 2025-06-23 PROCEDURE — 11900 INJECT SKIN LESIONS </W 7: CPT | Mod: 59 | Performed by: PHYSICIAN ASSISTANT

## 2025-06-23 PROCEDURE — 99203 OFFICE O/P NEW LOW 30 MIN: CPT | Mod: 25 | Performed by: PHYSICIAN ASSISTANT

## 2025-06-23 PROCEDURE — 17110 DESTRUCTION B9 LES UP TO 14: CPT | Performed by: PHYSICIAN ASSISTANT

## 2025-06-23 RX ORDER — CANDIDA ALBICANS 1000 [PNU]/ML
0.3 INJECTION, SOLUTION INTRADERMAL ONCE
Status: COMPLETED | OUTPATIENT
Start: 2025-06-23 | End: 2025-06-23

## 2025-06-23 RX ADMIN — CANDIDA ALBICANS 0.3 ML: 1000 INJECTION, SOLUTION INTRADERMAL at 10:26

## 2025-06-23 NOTE — LETTER
6/23/2025      Jared Cabrera  [address]  [place] [id number]      Dear Colleague,    Thank you for referring your patient, Jared Cabrera, to the [facility]. Please see a copy of my visit note below.    Clinic Administered Medication Documentation        Patient was given Candin. Prior to medication administration, verified patient's identity using patient s name and date of birth. Please see MAR and medication order for additional information. Patient instructed to remain in clinic for 15 minutes and report any adverse reaction to staff immediately.    Vial/Syringe: Multi dose vial      [facility] Dermatology Note  Encounter Date: Jun 23, 2025  Office Visit     Reviewed patients past medical history and pertinent chart review prior to patients visit today.     Dermatology Problem List:  1. Wart, left lateral plantar foot   - Cryotherapy and Candida antigen injection, 6/23/2025   - Previous: Over-the-counter treatments   - History of warts treated with TCA, cryotherapy, and Candida in 2021    ____________________________________________      CC: Wart (Left foot)    HPI:  Mr. Jared Cabrera is a(n) 35 year old male who presents today as a return patient for a wart involving the left lateral plantar foot.  This has been present for about a year.  The patient was last seen in 2021, at which time he had warts that were being treated with TCA, cryotherapy, and Candida antigen injections.  The patient has tried over-the-counter treatments for the current wart on his left foot.  He states cryotherapy along with Candida injections were the most effective in the past.    The patient also notes a lesion involving the left lower inner mucosal lip.  He believes that this occurred after accidentally biting the area.  The patient does bite this area at times when eating.  He also notes a lesion involving the left submental area.      Patient is otherwise feeling well, without  additional skin concerns.    Medications:  Current Outpatient Medications   Medication Sig Dispense Refill     econazole nitrate 1 % external cream Apply topically daily. (Patient not taking: Reported on 6/23/2025) 170 g 3     hydrocortisone 2.5 % cream Apply topically 2 times daily. (Patient not taking: Reported on 6/23/2025) 90 g 0     rosuvastatin (CRESTOR) 5 MG tablet Take 1 tablet (5 mg) by mouth daily. 90 tablet 0     No current facility-administered medications for this visit.      Past Medical History:   There is no problem list on file for this patient.    No past medical history on file.    ____________________________________________    Physical Exam:  Vitals: There were no vitals taken for this visit.  SKIN: Left plantar foot, face, and inner mucosal lip examined  - Left lateral plantar foot, flesh colored verrucous papule  - Left lower inner mucosal lip, flesh colored smooth well-demarcated round papule  - Left submental area, light pink to flesh-colored slightly firm scarred papule overlying a hair follicle, with no specific concerning findings present upon dermoscopy    - No other lesions of concern on areas examined.       ____________________________________________    Assessment & Plan:   # Verruca vulgaris, left lateral plantar foot  Counseled on natural history and viral etiology of this condition. Counseled that these are often recalcitrant to treatment. Discussed all treatment options that we offer as well as side effects of each including cryotherapy, topical therapy and candida injections. Advised that highest rates of success can be observed with combination treatments.    The patient requested both cryotherapy and Candida antigen injection today.    - Cryotherapy: Patient elects to treat warts today with cryotherapy. After verbal consent and discussion of risks and benefits including but no limited to dyspigmentation/scar, blister, and pain. After being cleansed with an alcohol swab, the  wart(s) were pared down with a 15 blade and then a total of 1 warts were treated with 1-2mm freeze border for 3 cycle with liquid nitrogen. Post cryotherapy instructions were provided.     -Candida injection: We agreed upon a series of intralesional immunotherapy with candida antigen.  This is a series of 3-4 injections and is about 70% effective.  Most patients don't see any improvement until after at least 2 injections.  After verbal consent was obtained, the skin was cleaned with an alcohol wipe and 0.3 ml of candida was injected under lesions on the left lateral plantar foot the patient tolerated the procedure well.  The patient kindly declined waiting in the exam room for 20 to 30 minutes post injection.  However, if he would experience shortness of breath, tightening of throat, or swelling of mouth or throat, the patient should seek emergent care.    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  Referral has been placed for the patient to see ENT for a biopsy/removal of the lesion.    # Suspected scarred folliculitis versus prurigo nodule versus pickers papule, left submental area  This lesion overall has reassuring features upon dermoscopy.  It does appear scarred and overlies a hair follicle.  I suspect previous folliculitis versus prurigo nodule versus pickers papule.  Patient should return for reassessment if changes or symptoms would develop.    Follow-up: 1 month(s) in-person, or earlier for new or changing lesions    All risks, benefits and alternatives were discussed with patient.  Patient is in agreement and understands the assessment and plan.  All questions were answered.    Yesika Saeed PA-C  [facility]    CC Referred MD Cristino  No address on file on close of this encounter.     Again, thank you for allowing me to participate in the care of your patient.         Sincerely,        Yesika Saeed PA-C    Electronically signed

## 2025-06-23 NOTE — PROGRESS NOTES
[facility] Dermatology Note  Encounter Date: Jun 23, 2025  Office Visit     Reviewed patients past medical history and pertinent chart review prior to patients visit today.     Dermatology Problem List:  1. Wart, left lateral plantar foot   - Cryotherapy and Candida antigen injection, 6/23/2025   - Previous: Over-the-counter treatments   - History of warts treated with TCA, cryotherapy, and Candida in 2021    ____________________________________________      CC: Wart (Left foot)    HPI:  Mr. Jared Cabrera is a(n) 35 year old male who presents today as a return patient for a wart involving the left lateral plantar foot.  This has been present for about a year.  The patient was last seen in 2021, at which time he had warts that were being treated with TCA, cryotherapy, and Candida antigen injections.  The patient has tried over-the-counter treatments for the current wart on his left foot.  He states cryotherapy along with Candida injections were the most effective in the past.    The patient also notes a lesion involving the left lower inner mucosal lip.  He believes that this occurred after accidentally biting the area.  The patient does bite this area at times when eating.  He also notes a lesion involving the left submental area.      Patient is otherwise feeling well, without additional skin concerns.    Medications:  Current Outpatient Medications   Medication Sig Dispense Refill    econazole nitrate 1 % external cream Apply topically daily. (Patient not taking: Reported on 6/23/2025) 170 g 3    hydrocortisone 2.5 % cream Apply topically 2 times daily. (Patient not taking: Reported on 6/23/2025) 90 g 0    rosuvastatin (CRESTOR) 5 MG tablet Take 1 tablet (5 mg) by mouth daily. 90 tablet 0     No current facility-administered medications for this visit.      Past Medical History:   There is no problem list on file for this patient.    No past medical history on  file.    ____________________________________________    Physical Exam:  Vitals: There were no vitals taken for this visit.  SKIN: Left plantar foot, face, and inner mucosal lip examined  - Left lateral plantar foot, flesh colored verrucous papule  - Left lower inner mucosal lip, flesh colored smooth well-demarcated round papule  - Left submental area, light pink to flesh-colored slightly firm scarred papule overlying a hair follicle, with no specific concerning findings present upon dermoscopy    - No other lesions of concern on areas examined.       ____________________________________________    Assessment & Plan:   # Verruca vulgaris, left lateral plantar foot  Counseled on natural history and viral etiology of this condition. Counseled that these are often recalcitrant to treatment. Discussed all treatment options that we offer as well as side effects of each including cryotherapy, topical therapy and candida injections. Advised that highest rates of success can be observed with combination treatments.    The patient requested both cryotherapy and Candida antigen injection today.    - Cryotherapy: Patient elects to treat warts today with cryotherapy. After verbal consent and discussion of risks and benefits including but no limited to dyspigmentation/scar, blister, and pain. After being cleansed with an alcohol swab, the wart(s) were pared down with a 15 blade and then a total of 1 warts were treated with 1-2mm freeze border for 3 cycle with liquid nitrogen. Post cryotherapy instructions were provided.     -Candida injection: We agreed upon a series of intralesional immunotherapy with candida antigen.  This is a series of 3-4 injections and is about 70% effective.  Most patients don't see any improvement until after at least 2 injections.  After verbal consent was obtained, the skin was cleaned with an alcohol wipe and 0.3 ml of candida was injected under lesions on the left lateral plantar foot the patient  tolerated the procedure well.  The patient kindly declined waiting in the exam room for 20 to 30 minutes post injection.  However, if he would experience shortness of breath, tightening of throat, or swelling of mouth or throat, the patient should seek emergent care.    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  Referral has been placed for the patient to see ENT for a biopsy/removal of the lesion.    # Suspected scarred folliculitis versus prurigo nodule versus pickers papule, left submental area  This lesion overall has reassuring features upon dermoscopy.  It does appear scarred and overlies a hair follicle.  I suspect previous folliculitis versus prurigo nodule versus pickers papule.  Patient should return for reassessment if changes or symptoms would develop.    Follow-up: 1 month(s) in-person, or earlier for new or changing lesions    All risks, benefits and alternatives were discussed with patient.  Patient is in agreement and understands the assessment and plan.  All questions were answered.    Yesika Saeed PA-C  [facility] Dermatology    CC Referred MD Cristino  No address on file on close of this encounter.

## 2025-06-24 ENCOUNTER — PATIENT OUTREACH (OUTPATIENT)
Dept: CARE COORDINATION | Facility: CLINIC | Age: 35
End: 2025-06-24
Payer: COMMERCIAL

## 2025-06-25 DIAGNOSIS — E78.5 HYPERLIPIDEMIA LDL GOAL <70: ICD-10-CM

## 2025-06-25 DIAGNOSIS — I25.10 CORONARY ARTERY DISEASE DUE TO CALCIFIED CORONARY LESION: ICD-10-CM

## 2025-06-25 DIAGNOSIS — I25.84 CORONARY ARTERY DISEASE DUE TO CALCIFIED CORONARY LESION: ICD-10-CM

## 2025-06-25 RX ORDER — ROSUVASTATIN CALCIUM 5 MG/1
5 TABLET, COATED ORAL DAILY
Qty: 90 TABLET | Refills: 0 | Status: SHIPPED | OUTPATIENT
Start: 2025-06-25

## 2025-06-26 ENCOUNTER — PATIENT OUTREACH (OUTPATIENT)
Dept: CARE COORDINATION | Facility: CLINIC | Age: 35
End: 2025-06-26
Payer: COMMERCIAL

## 2025-07-08 ENCOUNTER — TELEPHONE (OUTPATIENT)
Dept: CARDIOLOGY | Facility: CLINIC | Age: 35
End: 2025-07-08
Payer: COMMERCIAL

## 2025-07-08 DIAGNOSIS — E78.5 HLD (HYPERLIPIDEMIA): Primary | ICD-10-CM

## 2025-07-08 NOTE — TELEPHONE ENCOUNTER
Spoke with patient about getting [facility] fasting labs prior to appointment on 7/11. Patient agreed and stated he will talk to his wife and they will call back and schedule with [facility] lab prior to Friday.    Vladimir Sánchez   [facility]- Cardiology   [address] [place] [id number]  Hours: 8:00am-4:30pm

## 2025-07-09 ENCOUNTER — E-VISIT (OUTPATIENT)
Dept: URGENT CARE | Facility: CLINIC | Age: 35
End: 2025-07-09
Payer: COMMERCIAL

## 2025-07-09 ENCOUNTER — LAB (OUTPATIENT)
Dept: LAB | Facility: CLINIC | Age: 35
End: 2025-07-09
Payer: COMMERCIAL

## 2025-07-09 DIAGNOSIS — B96.89 ACUTE BACTERIAL SINUSITIS: Primary | ICD-10-CM

## 2025-07-09 DIAGNOSIS — E78.5 HLD (HYPERLIPIDEMIA): ICD-10-CM

## 2025-07-09 DIAGNOSIS — J01.90 ACUTE BACTERIAL SINUSITIS: Primary | ICD-10-CM

## 2025-07-09 LAB
CHOLEST SERPL-MCNC: 148 MG/DL
FASTING STATUS PATIENT QL REPORTED: YES
HDLC SERPL-MCNC: 53 MG/DL
LDLC SERPL CALC-MCNC: 84 MG/DL
NONHDLC SERPL-MCNC: 95 MG/DL
TRIGL SERPL-MCNC: 53 MG/DL

## 2025-07-09 PROCEDURE — 80061 LIPID PANEL: CPT

## 2025-07-09 PROCEDURE — 36415 COLL VENOUS BLD VENIPUNCTURE: CPT

## 2025-07-09 NOTE — PATIENT INSTRUCTIONS
Acute Sinusitis: Care Instructions  Overview     Acute sinusitis is an inflammation of the mucous membranes inside the nose and sinuses. Sinuses are the hollow spaces in your skull around the eyes and nose. Acute sinusitis often follows a cold. Acute sinusitis causes thick, discolored mucus that drains from the nose or down the back of the throat. It also can cause pain and pressure in your head and face along with a stuffy or blocked nose.  In most cases, sinusitis gets better on its own in 1 to 2 weeks. But some mild symptoms may last for several weeks. Sometimes antibiotics are needed if there is a bacterial infection.  Follow-up care is a key part of your treatment and safety. Be sure to make and go to all appointments, and call your doctor if you are having problems. It's also a good idea to know your test results and keep a list of the medicines you take.  How can you care for yourself at home?  Use saline (saltwater) nasal washes. This can help keep your nasal passages open and wash out mucus and allergens.  You can buy saline nose washes at a grocery store or drugstore. Follow the instructions on the package.  You can make your own at home. Add 1 teaspoon of non-iodized salt and 1 teaspoon of baking soda to 2 cups of distilled or boiled and cooled water. Fill a squeeze bottle or a nasal cleansing pot (such as a neti pot) with the nasal wash. Then put the tip into your nostril, and lean over the sink. With your mouth open, gently squirt the liquid. Repeat on the other side.  Try a decongestant nasal spray like oxymetazoline (Afrin). Do not use it for more than 3 days in a row. Using it for more than 3 days can make your congestion worse.  If needed, take an over-the-counter pain medicine, such as acetaminophen (Tylenol), ibuprofen (Advil, Motrin), or naproxen (Aleve). Read and follow all instructions on the label.  If the doctor prescribed antibiotics, take them as directed. Do not stop taking them just  "because you feel better. You need to take the full course of antibiotics.  Be careful when taking over-the-counter cold or flu medicines and Tylenol at the same time. Many of these medicines have acetaminophen, which is Tylenol. Read the labels to make sure that you are not taking more than the recommended dose. Too much acetaminophen (Tylenol) can be harmful.  Try a steroid nasal spray. It may help with your symptoms.  Breathe warm, moist air. You can use a steamy shower, a hot bath, or a sink filled with hot water. Avoid cold, dry air. Using a humidifier in your home may help. Follow the directions for cleaning the machine.  When should you call for help?   Call your doctor now or seek immediate medical care if:    You have new or worse swelling, redness, or pain in your face or around one or both of your eyes.     You have double vision or a change in your vision.     You have a high fever.     You have a severe headache and a stiff neck.     You have mental changes, such as feeling confused or much less alert.   Watch closely for changes in your health, and be sure to contact your doctor if:    You are not getting better as expected.   Where can you learn more?  Go to [email or web address].net/patiented  Enter I933 in the search box to learn more about \"Acute Sinusitis: Care Instructions.\"  Current as of: October 27, 2024  Content Version: 14.5    6047-7768 [employer].   Care instructions adapted under license by your healthcare professional. If you have questions about a medical condition or this instruction, always ask your healthcare professional. [employer] disclaims any warranty or liability for your use of this information.    Thank you for choosing us for your care. I have placed an order for a prescription so that you can start treatment:  Orders Placed This Encounter   Medications     amoxicillin-clavulanate (AUGMENTIN) 875-125 MG tablet     Sig: Take 1 tablet by mouth 2 times " daily for 7 days.     Dispense:  14 tablet     Refill:  0        View your full visit summary for details by clicking on the link below. Your pharmacist will able to address any questions you may have about the medication.     If you're not feeling better within 5-7 days, please schedule an appointment.  You can schedule an appointment right here in [facility], or call [phone]  If the visit is for the same symptoms as your eVisit, we'll refund the cost of your eVisit if seen within seven days.

## 2025-07-11 PROBLEM — E78.41 ELEVATED LIPOPROTEIN(A): Status: ACTIVE | Noted: 2025-07-11

## 2025-07-15 ENCOUNTER — TELEPHONE (OUTPATIENT)
Dept: OTOLARYNGOLOGY | Facility: CLINIC | Age: 35
End: 2025-07-15

## 2025-07-15 ENCOUNTER — OFFICE VISIT (OUTPATIENT)
Dept: PEDIATRICS | Facility: CLINIC | Age: 35
End: 2025-07-15
Payer: COMMERCIAL

## 2025-07-15 VITALS
SYSTOLIC BLOOD PRESSURE: 113 MMHG | OXYGEN SATURATION: 97 % | DIASTOLIC BLOOD PRESSURE: 69 MMHG | TEMPERATURE: 97.9 F | HEART RATE: 59 BPM

## 2025-07-15 DIAGNOSIS — J01.90 ACUTE NON-RECURRENT SINUSITIS, UNSPECIFIED LOCATION: Primary | ICD-10-CM

## 2025-07-15 PROCEDURE — 3074F SYST BP LT 130 MM HG: CPT | Performed by: EMERGENCY MEDICINE

## 2025-07-15 PROCEDURE — 99214 OFFICE O/P EST MOD 30 MIN: CPT | Performed by: EMERGENCY MEDICINE

## 2025-07-15 PROCEDURE — 3078F DIAST BP <80 MM HG: CPT | Performed by: EMERGENCY MEDICINE

## 2025-07-15 PROCEDURE — 87070 CULTURE OTHR SPECIMN AEROBIC: CPT | Performed by: EMERGENCY MEDICINE

## 2025-07-15 PROCEDURE — 87077 CULTURE AEROBIC IDENTIFY: CPT | Performed by: EMERGENCY MEDICINE

## 2025-07-15 RX ORDER — OXYMETAZOLINE HYDROCHLORIDE 0.05 G/100ML
2 SPRAY NASAL 2 TIMES DAILY
Qty: 2 ML | Refills: 0 | Status: SHIPPED | OUTPATIENT
Start: 2025-07-15 | End: 2025-07-18

## 2025-07-15 RX ORDER — FLUTICASONE PROPIONATE 50 MCG
1 SPRAY, SUSPENSION (ML) NASAL DAILY
Qty: 9.9 ML | Refills: 0 | Status: SHIPPED | OUTPATIENT
Start: 2025-07-15 | End: 2025-08-14

## 2025-07-15 NOTE — PATIENT INSTRUCTIONS
I discussed your symptoms with an ENT doctor.  They recommended the following:    Continue taking augmentin for a total of 14 days.  If you do not respond to this antibiotic after including the below treatment, your antibiotic could be switched to levaquin.     Use afrin nasal spray in each nostril for 3 days.     Also begin daily flonase to help with nasal inflammation (this is a steroid nasal spray).     Also take zyrtec daily.     As your nose become less congested, you can try to resume using the neti pot again twice daily if able.  Make sure to not use the nasal sprays near the time you use the neti pot.     If your symptoms are not improving despite this treatment, you will need to see an ENT specialist.

## 2025-07-15 NOTE — TELEPHONE ENCOUNTER
On-call ENT Telephone Note    Received telephone call from Dr Hope asking about medication options for patient on day 7 of Augmentin with symptoms suggestive of sinusitis. I did not see the patient personally and my comments were based on information shared with me on the call. No reported signs or symptoms suggestive of complicated sinusitis.    Discussed that it may be helpful to add Afrin to decongest sinonasal tissue and allow better drainage outflow; could also consider Flonase although effect may take longer for onset.    Could consider changing to respiratory fluoroquinolone in case of resistance to Augmentin, with careful counseling given associated black box warnings.    Also recommended sending culture of nasal secretions, which could guide future antibiotic choices if needed.    Another option is PO steroids also with the goal of improving sinus outflow, although of course systemic glucocorticoids also need to be used with care.    Zyrtec is reasonable if patient has allergies normally; if not, it may not be making a big impact.    If symptoms persist despite appropriate abx coverage, could consider CT and formal ENT consult.

## 2025-07-15 NOTE — PROGRESS NOTES
"Acute and Diagnostic Services Clinic Visit    Assessment & Plan     Acute non-recurrent sinusitis, unspecified location  P[place] nasal [place]  He has had 6.5 days of augmentin and symptoms persist. He has green/yellow discharge in am and then clear runny nose throughout the day. No viral symptoms and covid test today was negative.  I discussed his case with ENT staff who felt the first thing that was needed was to get his nose to \"loosen up\" and to have him take afrin for 3 days and flonase.  She felt he should continue augmentin for 7 more days before switching to something like levaquin. She stated that zpak was usually tried before augmentin, so she would not recommend zpak.  She asked if I could try and sent a secretion sample to see what grows.  She also felt he could start the neti pot bid again once his nose starts to open up. If he doesn't improve despite all of this, than he would need to see an ENT specialist.  I discussed going to the ER if he develops fever or severe headache.     - Respiratory Aerobic Bacterial Culture  - amoxicillin-clavulanate (AUGMENTIN) 875-125 MG tablet; Take 1 tablet by mouth 2 times daily for 7 days.  - oxymetazoline (AFRIN) 0.05 % nasal spray; Spray 0.2 mLs (2 sprays) into both nostrils 2 times daily for 3 days.  - fluticasone (FLONASE) 50 MCG/ACT nasal spray; Spray 1 spray into both nostrils daily.      33 minutes spent by me on the date of the encounter doing chart review, patient visit, documentation, and discussion with other provider(s)     Discharge instructions:  I discussed your symptoms with an ENT doctor.  They recommended the following:    Continue taking augmentin for a total of 14 days.  If you do not respond to this antibiotic after including the below treatment, your antibiotic could be switched to levaquin.     Use afrin nasal spray in each nostril for 3 days.     Also begin daily flonase to help with nasal inflammation " "(this is a steroid nasal spray).     Also take zyrtec daily.     As your nose become less congested, you can try to resume using the neti pot again twice daily if able.  Make sure to not use the nasal sprays near the time you use the neti pot.     If your symptoms are not improving despite this treatment, you will need to see an ENT specialist.       Susan Coleman is a 35 year old, presenting for the following health issues:  Sinus Problem    36 yo male who presents to the [facility] for continued nasal congestion.  [place] and says when he gets in the lake he tends to \"play hard.\"  Because of prior sinus congestion he tends to do zyrtec and a neti pot before he [place] time [place] the [place] and if he develops any nasal symptoms he will take ibuprofen and his symptoms always get better.  This time he didn't have ibuprofen and he began having more nasal congestion.  He sinus feels plugged. He was prescribed augmentin bid and is on day 6.5 without any improvement.  He wakes up with a green/yellow nasal discharge and then will have more of a runny nose during the day.  Taking a shower can help clear some of the drainage. He feels pressure in his forehead and cheeks on both side.  He denies any recent viral symptoms.  He denies hearing changes. No f/c.  His son had a pneumonia 2.5 weeks ago.  He only coughs if some nasal drainage drips into the back of his throat.  He took a covid test today which was negative.  He has seasonal allergies but stopped the zyrtec and neti pot once he started augmentin.             Acute Illness  Acute illness concerns: Sinus issues  Onset/Duration: 7/4  Symptoms:  Fever: No  Chills/Sweats: No  Headache (location?): YES  Sinus Pressure: YES           Decreased energy level: No  Conjunctivitis:  No  Ear Pain: no  Rhinorrhea: YES  Congestion: No  Sore Throat: YES- occasionally   Cough: YES-Feels its from nasal drip only   Wheeze: No           Breathing fast: No  Decreased " Appetite/Intake: No  Nausea: No  Vomiting: No  Diarrhea: No  Genitourinary symptoms: No  Progression of symptoms: same constant  Sick/Strep Exposure: No  Therapies tried and outcome: Antibiotic last dose tomorrow 7/16    At home COVID test negative        Objective    There were no vitals taken for this visit.  There is no height or weight on file to calculate BMI.  Physical Exam  Vitals and nursing note reviewed.   Constitutional:       General: He is not in acute distress.     Appearance: Normal appearance. He is normal weight. He is not ill-appearing, toxic-appearing or diaphoretic.   HENT:      Head: Normocephalic and atraumatic.      Right Ear: Tympanic membrane, ear canal and external ear normal.      Left Ear: Tympanic membrane, ear canal and external ear normal.      Nose: Congestion present.      Comments: He sounds nasally congested when he talks.   Eyes:      General:         Right eye: No discharge.         Left eye: No discharge.      Extraocular Movements: Extraocular movements intact.      Conjunctiva/sclera: Conjunctivae normal.   Cardiovascular:      Rate and Rhythm: Normal rate and regular rhythm.      Heart sounds: Normal heart sounds.   Pulmonary:      Effort: Pulmonary effort is normal.      Breath sounds: Normal breath sounds.   Musculoskeletal:         General: Normal range of motion.      Cervical back: Normal range of motion. No rigidity or tenderness.   Lymphadenopathy:      Cervical: No cervical adenopathy.   Skin:     Findings: No rash.   Neurological:      General: No focal deficit present.      Mental Status: He is alert and oriented to person, place, and time.   Psychiatric:         Mood and Affect: Mood normal.                    Signed Electronically by: Jessica Hope MD

## 2025-07-17 LAB
BACTERIA SPEC CULT: ABNORMAL
BACTERIA SPEC CULT: ABNORMAL

## 2025-07-21 ENCOUNTER — OFFICE VISIT (OUTPATIENT)
Dept: FAMILY MEDICINE | Facility: CLINIC | Age: 35
End: 2025-07-21
Payer: COMMERCIAL

## 2025-07-21 VITALS
HEIGHT: 73 IN | HEART RATE: 63 BPM | TEMPERATURE: 97.9 F | BODY MASS INDEX: 23.86 KG/M2 | OXYGEN SATURATION: 97 % | DIASTOLIC BLOOD PRESSURE: 74 MMHG | WEIGHT: 180.06 LBS | RESPIRATION RATE: 16 BRPM | SYSTOLIC BLOOD PRESSURE: 122 MMHG

## 2025-07-21 DIAGNOSIS — I25.10 CORONARY ARTERY DISEASE DUE TO CALCIFIED CORONARY LESION: ICD-10-CM

## 2025-07-21 DIAGNOSIS — B37.2 CANDIDAL INTERTRIGO: ICD-10-CM

## 2025-07-21 DIAGNOSIS — E78.5 HYPERLIPIDEMIA LDL GOAL <70: ICD-10-CM

## 2025-07-21 DIAGNOSIS — J01.00 ACUTE NON-RECURRENT MAXILLARY SINUSITIS: Primary | ICD-10-CM

## 2025-07-21 DIAGNOSIS — I25.84 CORONARY ARTERY DISEASE DUE TO CALCIFIED CORONARY LESION: ICD-10-CM

## 2025-07-21 PROCEDURE — 3074F SYST BP LT 130 MM HG: CPT | Performed by: INTERNAL MEDICINE

## 2025-07-21 PROCEDURE — 99214 OFFICE O/P EST MOD 30 MIN: CPT | Performed by: INTERNAL MEDICINE

## 2025-07-21 PROCEDURE — 3078F DIAST BP <80 MM HG: CPT | Performed by: INTERNAL MEDICINE

## 2025-07-21 RX ORDER — DOXYCYCLINE HYCLATE 100 MG
100 TABLET ORAL 2 TIMES DAILY
Qty: 20 TABLET | Refills: 0 | Status: SHIPPED | OUTPATIENT
Start: 2025-07-21

## 2025-07-21 RX ORDER — ROSUVASTATIN CALCIUM 10 MG/1
10 TABLET, COATED ORAL DAILY
Qty: 90 TABLET | Refills: 3 | Status: SHIPPED | OUTPATIENT
Start: 2025-07-21

## 2025-07-21 NOTE — PROGRESS NOTES
Assessment & Plan     Acute non-recurrent maxillary sinusitis  He [place] on [place] long weekend  Soon after that he started experiencing sinusitis symptoms  He was seen in the urgent care initially and was placed on Augmentin  However his symptoms were not improving so he was seen in the [facility]  Was advised to continue the Augmentin and finish the course for a total of 14 days  Currently he is still experiencing sinusitis symptoms and having headaches and drainage  He tried Flonase and was also taking decongestants  He is also been doing All At Home pot  On examination today he has got some sinus tenderness in the frontal and maxillary region  Discussed with him about levofloxacin which was what the ENT recommended if symptoms does not resolve with Augmentin   However he is a avid runner and he is concerned about tendinopathy that can occur with levofloxacin for this reason will not be doing this  We will proceed with doxycycline  Discussed about imaging with CT of the sinuses without contrast if there is no response to doxycycline and also discussed about potentially using a short burst of steroids if symptoms persist  We also discussed about Bromaline/steam inhalation  - doxycycline hyclate (VIBRA-TABS) 100 MG tablet; Take 1 tablet (100 mg) by mouth 2 times daily.    Candidal intertrigo  This has resolved        Subjective   Jared is a 35 year old, presenting for the following health issues:  Sinus Problem    History of Present Illness       Reason for visit:  Sinus infection continuing    He eats 2-3 servings of fruits and vegetables daily.He consumes 0 sweetened beverage(s) daily.He exercises with enough effort to increase his heart rate 60 or more minutes per day.  He exercises with enough effort to increase his heart rate 7 days per week.   He is taking medications regularly.          Acute Illness  Acute illness concerns: sinus   Onset/Duration: July 3rd  Symptoms:  Fever: No  Chills/Sweats: No  Headache  "(location?): YES  Sinus Pressure: YES  Conjunctivitis:  YES  Ear Pain: no  Rhinorrhea: No  Congestion: YES  Sore Throat: No  Cough: no  Wheeze: No  Decreased Appetite: No  Nausea: No  Vomiting: No  Diarrhea: No  Dysuria/Freq.: No  Dysuria or Hematuria: No  Fatigue/Achiness: YES  Sick/Strep Exposure: No  Therapies tried and outcome: None        Review of Systems  Constitutional, HEENT, cardiovascular, pulmonary, gi and gu systems are negative, except as otherwise noted.      Objective    /74 (BP Location: Right arm, Patient Position: Sitting, Cuff Size: Adult Large)   Pulse 63   Temp 97.9  F (36.6  C) (Temporal)   Resp 16   Ht 1.854 m (6' 1\")   Wt 81.7 kg (180 lb 1 oz)   SpO2 97%   BMI 23.76 kg/m    Body mass index is 23.76 kg/m .  Physical Exam   GENERAL: alert and no distress  EYES: Eyes grossly normal to inspection, PERRL and conjunctivae and sclerae normal  HENT: Tender on palpation of the maxillary and frontal sinus areas  RESP: lungs clear to auscultation - no rales, rhonchi or wheezes  CV: regular rate and rhythm, normal S1 S2, no S3 or S4, no murmur, click or rub, no peripheral edema  MS: no gross musculoskeletal defects noted, no edema            Signed Electronically by: Adrian Rangel MD    "